# Patient Record
Sex: FEMALE | Race: WHITE | Employment: UNEMPLOYED | URBAN - METROPOLITAN AREA
[De-identification: names, ages, dates, MRNs, and addresses within clinical notes are randomized per-mention and may not be internally consistent; named-entity substitution may affect disease eponyms.]

---

## 2019-05-17 ENCOUNTER — HOSPITAL ENCOUNTER (OUTPATIENT)
Age: 84
Discharge: HOME | End: 2019-05-19
Payer: MEDICARE

## 2019-05-17 LAB
ALBUMIN SERPL-MCNC: 4.1 G/DL (ref 3.5–5.2)
ALP BLD-CCNC: 66 U/L (ref 35–104)
ALT SERPL-CCNC: 13 U/L (ref 0–32)
ANION GAP SERPL CALCULATED.3IONS-SCNC: 15 MMOL/L (ref 7–16)
AST SERPL-CCNC: 25 U/L (ref 0–31)
BILIRUB SERPL-MCNC: 0.3 MG/DL (ref 0–1.2)
BUN BLDV-MCNC: 24 MG/DL (ref 8–23)
CALCIUM SERPL-MCNC: 9.6 MG/DL (ref 8.6–10.2)
CHLORIDE BLD-SCNC: 105 MMOL/L (ref 98–107)
CHOLESTEROL, TOTAL: 251 MG/DL (ref 0–199)
CO2: 20 MMOL/L (ref 22–29)
CREAT SERPL-MCNC: 0.9 MG/DL (ref 0.5–1)
GFR AFRICAN AMERICAN: >60
GFR NON-AFRICAN AMERICAN: 59 ML/MIN/1.73
GLUCOSE BLD-MCNC: 88 MG/DL (ref 74–99)
HBA1C MFR BLD: 5.4 % (ref 4–5.6)
HCT VFR BLD CALC: 38.4 % (ref 34–48)
HDLC SERPL-MCNC: 81 MG/DL
HEMOGLOBIN: 12.4 G/DL (ref 11.5–15.5)
LDL CHOLESTEROL CALCULATED: 150 MG/DL (ref 0–99)
MCH RBC QN AUTO: 30.8 PG (ref 26–35)
MCHC RBC AUTO-ENTMCNC: 32.3 % (ref 32–34.5)
MCV RBC AUTO: 95.3 FL (ref 80–99.9)
PDW BLD-RTO: 13.3 FL (ref 11.5–15)
PLATELET # BLD: 240 E9/L (ref 130–450)
PMV BLD AUTO: 10.1 FL (ref 7–12)
POTASSIUM SERPL-SCNC: 4 MMOL/L (ref 3.5–5)
RBC # BLD: 4.03 E12/L (ref 3.5–5.5)
SODIUM BLD-SCNC: 140 MMOL/L (ref 132–146)
TOTAL PROTEIN: 7.2 G/DL (ref 6.4–8.3)
TRIGL SERPL-MCNC: 99 MG/DL (ref 0–149)
VITAMIN B-12: 961 PG/ML (ref 211–946)
VITAMIN D 25-HYDROXY: 33 NG/ML (ref 30–100)
VLDLC SERPL CALC-MCNC: 20 MG/DL
WBC # BLD: 5.2 E9/L (ref 4.5–11.5)

## 2019-05-17 PROCEDURE — 82306 VITAMIN D 25 HYDROXY: CPT

## 2019-05-17 PROCEDURE — 82607 VITAMIN B-12: CPT

## 2019-05-17 PROCEDURE — 80061 LIPID PANEL: CPT

## 2019-05-17 PROCEDURE — 85027 COMPLETE CBC AUTOMATED: CPT

## 2019-05-17 PROCEDURE — 80053 COMPREHEN METABOLIC PANEL: CPT

## 2019-05-17 PROCEDURE — 84443 ASSAY THYROID STIM HORMONE: CPT

## 2019-05-17 PROCEDURE — 83036 HEMOGLOBIN GLYCOSYLATED A1C: CPT

## 2019-05-20 LAB — TSH SERPL DL<=0.05 MIU/L-ACNC: 2.49 UIU/ML (ref 0.27–4.2)

## 2020-03-11 ENCOUNTER — HOSPITAL ENCOUNTER (OUTPATIENT)
Age: 85
Discharge: HOME | End: 2020-03-13
Payer: MEDICARE

## 2020-03-11 LAB
ALBUMIN SERPL-MCNC: 3.9 G/DL (ref 3.5–5.2)
ALP BLD-CCNC: 77 U/L (ref 35–104)
ALT SERPL-CCNC: 10 U/L (ref 0–32)
ANION GAP SERPL CALCULATED.3IONS-SCNC: 20 MMOL/L (ref 7–16)
AST SERPL-CCNC: 21 U/L (ref 0–31)
BACTERIA: ABNORMAL /HPF
BILIRUB SERPL-MCNC: 0.2 MG/DL (ref 0–1.2)
BILIRUBIN URINE: NEGATIVE
BLOOD, URINE: NORMAL
BUN BLDV-MCNC: 28 MG/DL (ref 8–23)
CALCIUM SERPL-MCNC: 9.9 MG/DL (ref 8.6–10.2)
CHLORIDE BLD-SCNC: 108 MMOL/L (ref 98–107)
CHOLESTEROL, TOTAL: 287 MG/DL (ref 0–199)
CLARITY: CLEAR
CO2: 18 MMOL/L (ref 22–29)
COLOR: YELLOW
CREAT SERPL-MCNC: 1.1 MG/DL (ref 0.5–1)
EPITHELIAL CELLS, UA: ABNORMAL /HPF
GFR AFRICAN AMERICAN: 56
GFR NON-AFRICAN AMERICAN: 47 ML/MIN/1.73
GLUCOSE BLD-MCNC: 96 MG/DL (ref 74–99)
GLUCOSE URINE: NEGATIVE MG/DL
HCT VFR BLD CALC: 40.2 % (ref 34–48)
HDLC SERPL-MCNC: 84 MG/DL
HEMOGLOBIN: 12.7 G/DL (ref 11.5–15.5)
KETONES, URINE: NEGATIVE MG/DL
LDL CHOLESTEROL CALCULATED: 178 MG/DL (ref 0–99)
LEUKOCYTE ESTERASE, URINE: NEGATIVE
MCH RBC QN AUTO: 30.4 PG (ref 26–35)
MCHC RBC AUTO-ENTMCNC: 31.6 % (ref 32–34.5)
MCV RBC AUTO: 96.2 FL (ref 80–99.9)
NITRITE, URINE: NEGATIVE
PDW BLD-RTO: 12.8 FL (ref 11.5–15)
PH UA: 5 (ref 5–9)
PLATELET # BLD: 272 E9/L (ref 130–450)
PMV BLD AUTO: 10.6 FL (ref 7–12)
POTASSIUM SERPL-SCNC: 4.1 MMOL/L (ref 3.5–5)
PROTEIN UA: NORMAL MG/DL
RBC # BLD: 4.18 E12/L (ref 3.5–5.5)
RBC UA: ABNORMAL /HPF (ref 0–2)
SODIUM BLD-SCNC: 146 MMOL/L (ref 132–146)
SPECIFIC GRAVITY UA: >=1.03 (ref 1–1.03)
TOTAL PROTEIN: 7.3 G/DL (ref 6.4–8.3)
TRIGL SERPL-MCNC: 126 MG/DL (ref 0–149)
TSH SERPL DL<=0.05 MIU/L-ACNC: 2.75 UIU/ML (ref 0.27–4.2)
UROBILINOGEN, URINE: 0.2 E.U./DL
VITAMIN D 25-HYDROXY: 32 NG/ML (ref 30–100)
VLDLC SERPL CALC-MCNC: 25 MG/DL
WBC # BLD: 6.3 E9/L (ref 4.5–11.5)
WBC UA: ABNORMAL /HPF (ref 0–5)

## 2020-03-11 PROCEDURE — 81001 URINALYSIS AUTO W/SCOPE: CPT

## 2020-03-11 PROCEDURE — 84443 ASSAY THYROID STIM HORMONE: CPT

## 2020-03-11 PROCEDURE — 85027 COMPLETE CBC AUTOMATED: CPT

## 2020-03-11 PROCEDURE — 80053 COMPREHEN METABOLIC PANEL: CPT

## 2020-03-11 PROCEDURE — 82306 VITAMIN D 25 HYDROXY: CPT

## 2020-03-11 PROCEDURE — 80061 LIPID PANEL: CPT

## 2021-01-04 ENCOUNTER — APPOINTMENT (OUTPATIENT)
Dept: GENERAL RADIOLOGY | Age: 86
DRG: 552 | End: 2021-01-04
Payer: MEDICARE

## 2021-01-04 ENCOUNTER — APPOINTMENT (OUTPATIENT)
Dept: CT IMAGING | Age: 86
DRG: 552 | End: 2021-01-04
Payer: MEDICARE

## 2021-01-04 ENCOUNTER — HOSPITAL ENCOUNTER (INPATIENT)
Age: 86
LOS: 2 days | Discharge: NURSING, REHAB OR LONG-TERM CARE | DRG: 552 | End: 2021-01-06
Attending: EMERGENCY MEDICINE | Admitting: INTERNAL MEDICINE
Payer: MEDICARE

## 2021-01-04 DIAGNOSIS — S12.501A CLOSED NONDISPLACED FRACTURE OF SIXTH CERVICAL VERTEBRA, UNSPECIFIED FRACTURE MORPHOLOGY, INITIAL ENCOUNTER (HCC): Primary | ICD-10-CM

## 2021-01-04 DIAGNOSIS — S42.291A HUMERAL HEAD FRACTURE, RIGHT, CLOSED, INITIAL ENCOUNTER: ICD-10-CM

## 2021-01-04 DIAGNOSIS — S42.024A CLOSED NONDISPLACED FRACTURE OF SHAFT OF RIGHT CLAVICLE, INITIAL ENCOUNTER: ICD-10-CM

## 2021-01-04 DIAGNOSIS — S12.601A CLOSED NONDISPLACED FRACTURE OF SEVENTH CERVICAL VERTEBRA, UNSPECIFIED FRACTURE MORPHOLOGY, INITIAL ENCOUNTER (HCC): ICD-10-CM

## 2021-01-04 DIAGNOSIS — I67.1 SACCULAR ANEURYSM: ICD-10-CM
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
## 2021-01-04 PROBLEM — R53.1 WEAKNESS: Status: ACTIVE | Noted: 2021-01-04

## 2021-01-04 PROBLEM — S12.500A: Status: ACTIVE | Noted: 2021-01-04

## 2021-01-04 LAB
ALBUMIN SERPL-MCNC: 3.9 G/DL (ref 3.5–5.2)
ALP BLD-CCNC: 97 U/L (ref 35–104)
ALT SERPL-CCNC: 15 U/L (ref 0–32)
ANION GAP SERPL CALCULATED.3IONS-SCNC: 14 MMOL/L (ref 7–16)
AST SERPL-CCNC: 27 U/L (ref 0–31)
BACTERIA: ABNORMAL /HPF
BASOPHILS ABSOLUTE: 0.06 E9/L (ref 0–0.2)
BASOPHILS RELATIVE PERCENT: 0.5 % (ref 0–2)
BILIRUB SERPL-MCNC: 0.6 MG/DL (ref 0–1.2)
BILIRUBIN URINE: NEGATIVE
BLOOD, URINE: ABNORMAL
BUN BLDV-MCNC: 20 MG/DL (ref 8–23)
CALCIUM SERPL-MCNC: 9.9 MG/DL (ref 8.6–10.2)
CHLORIDE BLD-SCNC: 103 MMOL/L (ref 98–107)
CLARITY: CLEAR
CO2: 19 MMOL/L (ref 22–29)
COLOR: YELLOW
CREAT SERPL-MCNC: 1.1 MG/DL (ref 0.5–1)
EKG ATRIAL RATE: 96 BPM
EKG P AXIS: 75 DEGREES
EKG P-R INTERVAL: 174 MS
EKG Q-T INTERVAL: 404 MS
EKG QRS DURATION: 118 MS
EKG QTC CALCULATION (BAZETT): 474 MS
EKG R AXIS: -57 DEGREES
EKG T AXIS: 99 DEGREES
EKG VENTRICULAR RATE: 83 BPM
EOSINOPHILS ABSOLUTE: 0.04 E9/L (ref 0.05–0.5)
EOSINOPHILS RELATIVE PERCENT: 0.3 % (ref 0–6)
FOLATE: 14.6 NG/ML (ref 4.8–24.2)
GFR AFRICAN AMERICAN: 56
GFR NON-AFRICAN AMERICAN: 46 ML/MIN/1.73
GLUCOSE BLD-MCNC: 130 MG/DL (ref 74–99)
GLUCOSE URINE: NEGATIVE MG/DL
HCT VFR BLD CALC: 40.1 % (ref 34–48)
HEMOGLOBIN: 13.4 G/DL (ref 11.5–15.5)
HYALINE CASTS: ABNORMAL /LPF (ref 0–2)
IMMATURE GRANULOCYTES #: 0.06 E9/L
IMMATURE GRANULOCYTES %: 0.5 % (ref 0–5)
KETONES, URINE: NEGATIVE MG/DL
LEUKOCYTE ESTERASE, URINE: NEGATIVE
LYMPHOCYTES ABSOLUTE: 1.55 E9/L (ref 1.5–4)
LYMPHOCYTES RELATIVE PERCENT: 11.7 % (ref 20–42)
MCH RBC QN AUTO: 30.9 PG (ref 26–35)
MCHC RBC AUTO-ENTMCNC: 33.4 % (ref 32–34.5)
MCV RBC AUTO: 92.4 FL (ref 80–99.9)
MONOCYTES ABSOLUTE: 0.83 E9/L (ref 0.1–0.95)
MONOCYTES RELATIVE PERCENT: 6.3 % (ref 2–12)
NEUTROPHILS ABSOLUTE: 10.66 E9/L (ref 1.8–7.3)
NEUTROPHILS RELATIVE PERCENT: 80.7 % (ref 43–80)
NITRITE, URINE: NEGATIVE
PDW BLD-RTO: 11.9 FL (ref 11.5–15)
PH UA: 6 (ref 5–9)
PLATELET # BLD: 290 E9/L (ref 130–450)
PMV BLD AUTO: 9.6 FL (ref 7–12)
POTASSIUM REFLEX MAGNESIUM: 3.8 MMOL/L (ref 3.5–5)
PROTEIN UA: NEGATIVE MG/DL
RBC # BLD: 4.34 E12/L (ref 3.5–5.5)
RBC UA: ABNORMAL /HPF (ref 0–2)
SODIUM BLD-SCNC: 136 MMOL/L (ref 132–146)
SPECIFIC GRAVITY UA: 1.01 (ref 1–1.03)
TOTAL PROTEIN: 7 G/DL (ref 6.4–8.3)
TROPONIN: <0.01 NG/ML (ref 0–0.03)
TSH SERPL DL<=0.05 MIU/L-ACNC: <0.01 UIU/ML (ref 0.27–4.2)
UROBILINOGEN, URINE: 0.2 E.U./DL
VITAMIN B-12: 623 PG/ML (ref 211–946)
WBC # BLD: 13.2 E9/L (ref 4.5–11.5)
WBC UA: ABNORMAL /HPF (ref 0–5)

## 2021-01-04 PROCEDURE — 51702 INSERT TEMP BLADDER CATH: CPT

## 2021-01-04 PROCEDURE — 1200000000 HC SEMI PRIVATE

## 2021-01-04 PROCEDURE — 73521 X-RAY EXAM HIPS BI 2 VIEWS: CPT

## 2021-01-04 PROCEDURE — 82746 ASSAY OF FOLIC ACID SERUM: CPT

## 2021-01-04 PROCEDURE — 6360000002 HC RX W HCPCS: Performed by: INTERNAL MEDICINE

## 2021-01-04 PROCEDURE — 6370000000 HC RX 637 (ALT 250 FOR IP): Performed by: STUDENT IN AN ORGANIZED HEALTH CARE EDUCATION/TRAINING PROGRAM

## 2021-01-04 PROCEDURE — 6360000004 HC RX CONTRAST MEDICATION: Performed by: RADIOLOGY

## 2021-01-04 PROCEDURE — 2580000003 HC RX 258: Performed by: INTERNAL MEDICINE

## 2021-01-04 PROCEDURE — 81001 URINALYSIS AUTO W/SCOPE: CPT

## 2021-01-04 PROCEDURE — 96374 THER/PROPH/DIAG INJ IV PUSH: CPT

## 2021-01-04 PROCEDURE — 6360000002 HC RX W HCPCS: Performed by: STUDENT IN AN ORGANIZED HEALTH CARE EDUCATION/TRAINING PROGRAM

## 2021-01-04 PROCEDURE — 85025 COMPLETE CBC W/AUTO DIFF WBC: CPT

## 2021-01-04 PROCEDURE — 70496 CT ANGIOGRAPHY HEAD: CPT

## 2021-01-04 PROCEDURE — 84443 ASSAY THYROID STIM HORMONE: CPT

## 2021-01-04 PROCEDURE — 84484 ASSAY OF TROPONIN QUANT: CPT

## 2021-01-04 PROCEDURE — 2580000003 HC RX 258: Performed by: STUDENT IN AN ORGANIZED HEALTH CARE EDUCATION/TRAINING PROGRAM

## 2021-01-04 PROCEDURE — 73030 X-RAY EXAM OF SHOULDER: CPT

## 2021-01-04 PROCEDURE — 71045 X-RAY EXAM CHEST 1 VIEW: CPT

## 2021-01-04 PROCEDURE — 93005 ELECTROCARDIOGRAM TRACING: CPT | Performed by: STUDENT IN AN ORGANIZED HEALTH CARE EDUCATION/TRAINING PROGRAM

## 2021-01-04 PROCEDURE — 70450 CT HEAD/BRAIN W/O DYE: CPT

## 2021-01-04 PROCEDURE — 72125 CT NECK SPINE W/O DYE: CPT

## 2021-01-04 PROCEDURE — 99284 EMERGENCY DEPT VISIT MOD MDM: CPT

## 2021-01-04 PROCEDURE — 99231 SBSQ HOSP IP/OBS SF/LOW 25: CPT | Performed by: ORTHOPAEDIC SURGERY

## 2021-01-04 PROCEDURE — 36415 COLL VENOUS BLD VENIPUNCTURE: CPT

## 2021-01-04 PROCEDURE — 6370000000 HC RX 637 (ALT 250 FOR IP): Performed by: INTERNAL MEDICINE

## 2021-01-04 PROCEDURE — 82607 VITAMIN B-12: CPT

## 2021-01-04 PROCEDURE — 99223 1ST HOSP IP/OBS HIGH 75: CPT | Performed by: INTERNAL MEDICINE

## 2021-01-04 PROCEDURE — 99222 1ST HOSP IP/OBS MODERATE 55: CPT | Performed by: SURGERY

## 2021-01-04 PROCEDURE — 80053 COMPREHEN METABOLIC PANEL: CPT

## 2021-01-04 PROCEDURE — 93010 ELECTROCARDIOGRAM REPORT: CPT | Performed by: INTERNAL MEDICINE

## 2021-01-04 RX ORDER — ACETAMINOPHEN 325 MG/1
650 TABLET ORAL EVERY 6 HOURS PRN
Status: DISCONTINUED | OUTPATIENT
Start: 2021-01-04 | End: 2021-01-06 | Stop reason: HOSPADM

## 2021-01-04 RX ORDER — ACETAMINOPHEN 325 MG/1
650 TABLET ORAL ONCE
Status: COMPLETED | OUTPATIENT
Start: 2021-01-04 | End: 2021-01-04

## 2021-01-04 RX ORDER — SIMVASTATIN 40 MG
40 TABLET ORAL NIGHTLY
COMMUNITY

## 2021-01-04 RX ORDER — METOPROLOL SUCCINATE 25 MG/1
25 TABLET, EXTENDED RELEASE ORAL DAILY
Status: DISCONTINUED | OUTPATIENT
Start: 2021-01-04 | End: 2021-01-06 | Stop reason: HOSPADM

## 2021-01-04 RX ORDER — 0.9 % SODIUM CHLORIDE 0.9 %
500 INTRAVENOUS SOLUTION INTRAVENOUS ONCE
Status: COMPLETED | OUTPATIENT
Start: 2021-01-04 | End: 2021-01-04

## 2021-01-04 RX ORDER — LORAZEPAM 2 MG/ML
0.5 INJECTION INTRAMUSCULAR ONCE
Status: COMPLETED | OUTPATIENT
Start: 2021-01-04 | End: 2021-01-04

## 2021-01-04 RX ORDER — ACETAMINOPHEN 650 MG/1
650 SUPPOSITORY RECTAL EVERY 6 HOURS PRN
Status: DISCONTINUED | OUTPATIENT
Start: 2021-01-04 | End: 2021-01-06 | Stop reason: HOSPADM

## 2021-01-04 RX ORDER — LOSARTAN POTASSIUM AND HYDROCHLOROTHIAZIDE 25; 100 MG/1; MG/1
1 TABLET ORAL DAILY
Status: ON HOLD | COMMUNITY
End: 2021-01-06 | Stop reason: HOSPADM

## 2021-01-04 RX ORDER — POLYETHYLENE GLYCOL 3350 17 G/17G
17 POWDER, FOR SOLUTION ORAL DAILY PRN
Status: DISCONTINUED | OUTPATIENT
Start: 2021-01-04 | End: 2021-01-06 | Stop reason: HOSPADM

## 2021-01-04 RX ORDER — AMLODIPINE BESYLATE 5 MG/1
5 TABLET ORAL DAILY
Status: DISCONTINUED | OUTPATIENT
Start: 2021-01-04 | End: 2021-01-06 | Stop reason: HOSPADM

## 2021-01-04 RX ORDER — ONDANSETRON 2 MG/ML
4 INJECTION INTRAMUSCULAR; INTRAVENOUS EVERY 6 HOURS PRN
Status: DISCONTINUED | OUTPATIENT
Start: 2021-01-04 | End: 2021-01-06 | Stop reason: HOSPADM

## 2021-01-04 RX ORDER — LEVOTHYROXINE SODIUM 0.1 MG/1
100 TABLET ORAL DAILY
Status: DISCONTINUED | OUTPATIENT
Start: 2021-01-04 | End: 2021-01-05

## 2021-01-04 RX ORDER — ATORVASTATIN CALCIUM 10 MG/1
10 TABLET, FILM COATED ORAL DAILY
Status: DISCONTINUED | OUTPATIENT
Start: 2021-01-04 | End: 2021-01-06 | Stop reason: HOSPADM

## 2021-01-04 RX ORDER — SODIUM CHLORIDE 0.9 % (FLUSH) 0.9 %
10 SYRINGE (ML) INJECTION PRN
Status: DISCONTINUED | OUTPATIENT
Start: 2021-01-04 | End: 2021-01-06 | Stop reason: HOSPADM

## 2021-01-04 RX ORDER — AMLODIPINE BESYLATE 5 MG/1
5 TABLET ORAL DAILY
Status: ON HOLD | COMMUNITY
End: 2021-01-06 | Stop reason: HOSPADM

## 2021-01-04 RX ORDER — LEVOTHYROXINE SODIUM 0.1 MG/1
100 TABLET ORAL DAILY
Status: ON HOLD | COMMUNITY
End: 2021-01-06 | Stop reason: HOSPADM

## 2021-01-04 RX ORDER — SODIUM CHLORIDE 0.9 % (FLUSH) 0.9 %
10 SYRINGE (ML) INJECTION EVERY 12 HOURS SCHEDULED
Status: DISCONTINUED | OUTPATIENT
Start: 2021-01-04 | End: 2021-01-06 | Stop reason: HOSPADM

## 2021-01-04 RX ORDER — PROMETHAZINE HYDROCHLORIDE 25 MG/1
12.5 TABLET ORAL EVERY 6 HOURS PRN
Status: DISCONTINUED | OUTPATIENT
Start: 2021-01-04 | End: 2021-01-06 | Stop reason: HOSPADM

## 2021-01-04 RX ORDER — METOPROLOL SUCCINATE 25 MG/1
25 TABLET, EXTENDED RELEASE ORAL DAILY
COMMUNITY

## 2021-01-04 RX ADMIN — LEVOTHYROXINE SODIUM 100 MCG: 0.1 TABLET ORAL at 16:22

## 2021-01-04 RX ADMIN — IOPAMIDOL 75 ML: 755 INJECTION, SOLUTION INTRAVENOUS at 10:33

## 2021-01-04 RX ADMIN — METOPROLOL SUCCINATE 25 MG: 25 TABLET, EXTENDED RELEASE ORAL at 16:22

## 2021-01-04 RX ADMIN — ENOXAPARIN SODIUM 30 MG: 30 INJECTION SUBCUTANEOUS at 15:28

## 2021-01-04 RX ADMIN — ATORVASTATIN CALCIUM 10 MG: 10 TABLET, FILM COATED ORAL at 16:22

## 2021-01-04 RX ADMIN — SODIUM CHLORIDE 500 ML: 9 INJECTION, SOLUTION INTRAVENOUS at 11:13

## 2021-01-04 RX ADMIN — LORAZEPAM 0.5 MG: 2 INJECTION INTRAMUSCULAR; INTRAVENOUS at 12:14

## 2021-01-04 RX ADMIN — ACETAMINOPHEN 650 MG: 325 TABLET, FILM COATED ORAL at 08:27

## 2021-01-04 RX ADMIN — LORAZEPAM 0.5 MG: 2 INJECTION INTRAMUSCULAR; INTRAVENOUS at 09:45

## 2021-01-04 RX ADMIN — Medication 10 ML: at 19:44

## 2021-01-04 RX ADMIN — AMLODIPINE BESYLATE 5 MG: 5 TABLET ORAL at 16:22

## 2021-01-04 RX ADMIN — ACETAMINOPHEN 650 MG: 325 TABLET, FILM COATED ORAL at 23:08

## 2021-01-04 RX ADMIN — ACETAMINOPHEN 650 MG: 325 TABLET, FILM COATED ORAL at 15:27

## 2021-01-04 ASSESSMENT — PAIN DESCRIPTION - PAIN TYPE: TYPE: ACUTE PAIN

## 2021-01-04 ASSESSMENT — PAIN SCALES - GENERAL
PAINLEVEL_OUTOF10: 7
PAINLEVEL_OUTOF10: 9

## 2021-01-04 ASSESSMENT — PAIN DESCRIPTION - ORIENTATION: ORIENTATION: RIGHT;LEFT

## 2021-01-04 ASSESSMENT — PAIN DESCRIPTION - LOCATION: LOCATION: SHOULDER

## 2021-01-04 NOTE — CONSULTS
Department of Orthopedic Surgery  Resident Consult Note          Reason for Consult:  Right shoulder pain     HISTORY OF PRESENT ILLNESS:       Patient is a 80 y.o. female who presents with the chief complaint of right shoulder pain. The patient had a fall from standing height. She was brought to the ED by EMS for evaluation. The patient thinks she did bump her head she is unsure whether or not she lost consciousness. She states she thinks she may have fainted. The patient states she lives at home alone. She has had a prior proximal humerus fracture on the right in 2013. Denies numbness/tingling/paresthesias. Denies any other orthopedic complaints at this time. Past Medical History:        Diagnosis Date    Hyperlipidemia     Hypertension     Thyroid disease      Past Surgical History:        Procedure Laterality Date    HYSTERECTOMY       Current Medications:   Current Facility-Administered Medications: sodium chloride flush 0.9 % injection 10 mL, 10 mL, Intravenous, 2 times per day  sodium chloride flush 0.9 % injection 10 mL, 10 mL, Intravenous, PRN  promethazine (PHENERGAN) tablet 12.5 mg, 12.5 mg, Oral, Q6H PRN **OR** ondansetron (ZOFRAN) injection 4 mg, 4 mg, Intravenous, Q6H PRN  polyethylene glycol (GLYCOLAX) packet 17 g, 17 g, Oral, Daily PRN  acetaminophen (TYLENOL) tablet 650 mg, 650 mg, Oral, Q6H PRN **OR** acetaminophen (TYLENOL) suppository 650 mg, 650 mg, Rectal, Q6H PRN  enoxaparin (LOVENOX) injection 30 mg, 30 mg, Subcutaneous, Daily  amLODIPine (NORVASC) tablet 5 mg, 5 mg, Oral, Daily  levothyroxine (SYNTHROID) tablet 100 mcg, 100 mcg, Oral, Daily  atorvastatin (LIPITOR) tablet 10 mg, 10 mg, Oral, Daily  metoprolol succinate (TOPROL XL) extended release tablet 25 mg, 25 mg, Oral, Daily  Allergies:  Pcn [penicillins]    Social History:   TOBACCO:   reports that she has never smoked. She has never used smokeless tobacco.  ETOH:   reports no history of alcohol use.   DRUGS:   has no history on file for drug. ACTIVITIES OF DAILY LIVING:    OCCUPATION:    Family History:   History reviewed. No pertinent family history. REVIEW OF SYSTEMS:  CONSTITUTIONAL:  negative for  fevers, chills  EYES:  negative for blurred vision, visual disturbance  HEENT:  negative for  hearing loss, voice change  RESPIRATORY:  negative for  dyspnea, wheezing  CARDIOVASCULAR:  negative for  chest pain, palpitations  GASTROINTESTINAL:  negative for nausea, vomiting  GENITOURINARY:  negative for frequency, urinary incontinence  HEMATOLOGIC/LYMPHATIC:  negative for bleeding and petechiae  MUSCULOSKELETAL:  positive for  pain  NEUROLOGICAL:  negative for headaches, dizziness  BEHAVIOR/PSYCH:  negative for increased agitation and anxiety    PHYSICAL EXAM:    VITALS:  BP (!) 149/84   Pulse 100   Temp 96.9 °F (36.1 °C) (Axillary)   Resp 16   Ht 5' 5\" (1.651 m)   Wt 110 lb (49.9 kg)   SpO2 94%   BMI 18.30 kg/m²   CONSTITUTIONAL:  awake, alert, cooperative, no apparent distress, and appears stated age  MUSCULOSKELETAL:  Right upper Extremity:  ·  Skin intact circumferentially no tenting over the right clavicle   · +TTP over the right clavicle. There is crepitus noted when pressing on the right clavicle. Compartments soft and compressible  · +AIN/PIN/Ulnar nerve function intact grossly  · +Radial pulse, Brisk Cap refill, hand warm and perfused  · Sensation intact to touch in radial/ulnar/median nerve distributions to hand    Secondary Exam:   · leftUE: No obvious signs of trauma. -TTP to fingers, hand, wrist, forearm, elbow, humerus, shoulder or clavicle. compartments soft and compressible. · bilateralLE: No obvious signs of trauma. -TTP to foot, ankle, leg, knee, thigh, hip. compartments soft and compressible.      DATA:    CBC:   Lab Results   Component Value Date    WBC 13.2 01/04/2021    RBC 4.34 01/04/2021    HGB 13.4 01/04/2021    HCT 40.1 01/04/2021    MCV 92.4 01/04/2021    MCH 30.9 01/04/2021    MCHC 33.4 01/04/2021    RDW 11.9 01/04/2021     01/04/2021    MPV 9.6 01/04/2021     PT/INR:  No results found for: PROTIME, INR  CRP/ESR: No results found for: CRP, SEDRATE  Lactic Acid : No results found for: LACTA    Radiology Review:  01/04/21 - XR of the right shoulder demonstrates a mid shaft clavicle fracture with minimal displacement. There is also a healed proximal humerus fracture consistent with her films from 2013. IMPRESSION:   · Right Clavicle fracture  · Previous proximal humerus fracture from 2013    PLAN:  NWB - RUE  The patient should remain in her sling. Pain Control per primary  Recommend PT/OT   Continue ice and elevation to decrease swelling  · No acute surgical intervention needed at this time. · The patient can follow up with Dr. Anusha Head as an outpatient upon discharge. · Orthopedics will follow peripherally. ·   · I was present with the resident during the history and exam. I discussed the case with the resident and agree with the findings and plan as documented in the resident's note.

## 2021-01-04 NOTE — ED PROVIDER NOTES
Mike [address]      Pt Name: Leena Verdin  MRN: [id number]  [place] 3/31/1929  Date of evaluation: 1/4/2021      CHIEF COMPLAINT       Chief Complaint   Patient presents with   Render Clunes     from home, fell, on ground 30 minutes, alert to person, place, and circumstance, confused to time, lives at home alone, per EMS it would not have been safe to leave patient by herself without care, neighbors report intermittent increasing episodes of confusion        HPI  Leena Verdin is a 80 y.o. female with a past medical history of hyperlipidemia, hypertension presents from home status post fall. As per EMS patient was on the ground for 30 minutes. She is alert to person, place, and circumstance but confused to time. She lives home alone. As per EMS they felt it was not safe to leave the patient very soft without care. As per her neighbor she has had intermittent increasing episodes of confusion. History unable to be obtained from patient as she is confused. Review of systems also unable to be attained because of dementia        Except as noted above the remainder of the review of systems was reviewed and negative. Review of Systems   Unable to perform ROS: Dementia        Physical Exam  Vitals signs and nursing note reviewed. Constitutional:       General: She is not in acute distress. Appearance: Normal appearance. She is well-developed. She is not ill-appearing or diaphoretic. HENT:      Head: Normocephalic and atraumatic. Ears:      Comments: No hemotympanum bilaterally. Nose: Nose normal.      Comments: No septal hematoma bilaterally. Mouth/Throat:      Mouth: Mucous membranes are moist.      Pharynx: Oropharynx is clear. Eyes:      Pupils: Pupils are equal, round, and reactive to light. Neck:      Musculoskeletal: Muscular tenderness ( Tenderness to palpation of the midline of the cervical spine. No bony crepitance appreciated.   Patient kept in the cervical collar.) present. Cardiovascular:      Rate and Rhythm: Normal rate and regular rhythm. Pulses: Normal pulses. Heart sounds: Normal heart sounds. Pulmonary:      Effort: Pulmonary effort is normal. No respiratory distress. Breath sounds: Normal breath sounds. No wheezing or rales. Abdominal:      General: Bowel sounds are normal. There is no distension. Palpations: Abdomen is soft. There is no mass. Tenderness: There is no abdominal tenderness. There is no right CVA tenderness, left CVA tenderness, guarding or rebound. Hernia: No hernia is present. Musculoskeletal:         General: No swelling or tenderness. Right lower leg: No edema. Left lower leg: No edema. Comments: Mild C-spine tenderness. Skin:     General: Skin is warm and dry. Capillary Refill: Capillary refill takes less than 2 seconds. Neurological:      General: No focal deficit present. Mental Status: She is alert and oriented to person, place, and time. Cranial Nerves: No cranial nerve deficit. Motor: No weakness. Coordination: Coordination normal.   Psychiatric:         Mood and Affect: Mood normal.          Procedures     MDM  Number of Diagnoses or Management Options  Diagnosis management comments: 68-year-old female with a past medical history of dementia who lives by herself at home presents with status post unwitnessed fall at home. Vitals within normal limits. On physical exam patient is in no acute distress, speaking full sentences, alert and oriented x2. She is unable to state what happened to her yesterday. She is unaware if she had a syncopal event and fall or a mechanical medical.  HEENT is atraumatic normocephalic. Lungs clear to auscultation bilaterally no wheeze rales rhonchi. Normal S1-S2. Abdomen soft nontender, no rebound or guarding negative CVA tenderness bilaterally. No bilateral leg edema. Diagnostic labs within normal limits. Urinalysis negative for urinary tract infection. CT head negative for acute process. CT C-spine shows transverse fracture of C6-C7. It is a stable fracture. Patient has a right midshaft clavicular fracture. Patient has a right humeral head fracture. Patient unable to walk in department. She seems very agitated and anxious. Wants to speak and see her power of . I was unable to speak to patient about returning but was able to speak to the conservator of her state. He is agreeable with plan for patient to be placed at a assisted living facility as her living by herself is a danger to herself. Patient formed all the results of evaluation. She is agreeable plan. Dr. Naina Rosario admitted patient. Trauma service has been consulted. ED Course as of Jan 04 1755   Mon Jan 04, 2021   7889 Spoke with Dr. Lorna Sidhu radiology informing that the patient has stable fractures of C6-C7 transverse processesRight clavicular fracture on CT   CT Cervical Spine WO Contrast [JV]   1 Spoke with patient's conservator of her estate. As per her him, patient does not have any family and has a power of . She lives by herself but has home health aides stay during the day to evaluate her. States that the patient has dementia and is unable to take care of herself. [JV]   3490 Spoke to trauma service. They are aware of patient. Patient admitted to the hospital.    [JV]      ED Course User Index  [JV] Maggie Herman MD       --------------------------------------------- PAST HISTORY ---------------------------------------------  Past Medical History:  has a past medical history of Hyperlipidemia, Hypertension, and Thyroid disease. Past Surgical History:  has a past surgical history that includes Hysterectomy. Social History:  reports that she has never smoked. She has never used smokeless tobacco. She reports that she does not drink alcohol. Family History: family history is not on file.      The patients home medications have been reviewed.     Allergies: Pcn [penicillins]    -------------------------------------------------- RESULTS -------------------------------------------------    LABS:  Results for orders placed or performed during the hospital encounter of 01/04/21   CBC Auto Differential   Result Value Ref Range    WBC 13.2 (H) 4.5 - 11.5 E9/L    RBC 4.34 3.50 - 5.50 E12/L    Hemoglobin 13.4 11.5 - 15.5 g/dL    Hematocrit 40.1 34.0 - 48.0 %    MCV 92.4 80.0 - 99.9 fL    MCH 30.9 26.0 - 35.0 pg    MCHC 33.4 32.0 - 34.5 %    RDW 11.9 11.5 - 15.0 fL    Platelets 954 321 - 613 E9/L    MPV 9.6 7.0 - 12.0 fL    Neutrophils % 80.7 (H) 43.0 - 80.0 %    Immature Granulocytes % 0.5 0.0 - 5.0 %    Lymphocytes % 11.7 (L) 20.0 - 42.0 %    Monocytes % 6.3 2.0 - 12.0 %    Eosinophils % 0.3 0.0 - 6.0 %    Basophils % 0.5 0.0 - 2.0 %    Neutrophils Absolute 10.66 (H) 1.80 - 7.30 E9/L    Immature Granulocytes # 0.06 E9/L    Lymphocytes Absolute 1.55 1.50 - 4.00 E9/L    Monocytes Absolute 0.83 0.10 - 0.95 E9/L    Eosinophils Absolute 0.04 (L) 0.05 - 0.50 E9/L    Basophils Absolute 0.06 0.00 - 0.20 E9/L   Comprehensive Metabolic Panel w/ Reflex to MG   Result Value Ref Range    Sodium 136 132 - 146 mmol/L    Potassium reflex Magnesium 3.8 3.5 - 5.0 mmol/L    Chloride 103 98 - 107 mmol/L    CO2 19 (L) 22 - 29 mmol/L    Anion Gap 14 7 - 16 mmol/L    Glucose 130 (H) 74 - 99 mg/dL    BUN 20 8 - 23 mg/dL    CREATININE 1.1 (H) 0.5 - 1.0 mg/dL    GFR Non-African American 46 >=60 mL/min/1.73    GFR African American 56     Calcium 9.9 8.6 - 10.2 mg/dL    Total Protein 7.0 6.4 - 8.3 g/dL    Alb 3.9 3.5 - 5.2 g/dL    Total Bilirubin 0.6 0.0 - 1.2 mg/dL    Alkaline Phosphatase 97 35 - 104 U/L    ALT 15 0 - 32 U/L    AST 27 0 - 31 U/L   Urinalysis, reflex to microscopic   Result Value Ref Range    Color, UA Yellow Straw/Yellow    Clarity, UA Clear Clear    Glucose, Ur Negative Negative mg/dL    Bilirubin Urine Negative Negative Ketones, Urine Negative Negative mg/dL    Specific Gravity, UA 1.015 1.005 - 1.030    Blood, Urine SMALL (A) Negative    pH, UA 6.0 5.0 - 9.0    Protein, UA Negative Negative mg/dL    Urobilinogen, Urine 0.2 <2.0 E.U./dL    Nitrite, Urine Negative Negative    Leukocyte Esterase, Urine Negative Negative   Troponin   Result Value Ref Range    Troponin <0.01 0.00 - 0.03 ng/mL   Microscopic Urinalysis   Result Value Ref Range    Hyaline Casts, UA 0-2 0 - 2 /LPF    WBC, UA 0-1 0 - 5 /HPF    RBC, UA 0-1 0 - 2 /HPF    Bacteria, UA RARE (A) None Seen /HPF   TSH without Reflex   Result Value Ref Range    TSH <0.010 (L) 0.270 - 4.200 uIU/mL   EKG 12 Lead   Result Value Ref Range    Ventricular Rate 83 BPM    Atrial Rate 96 BPM    P-R Interval 174 ms    QRS Duration 118 ms    Q-T Interval 404 ms    QTc Calculation (Bazett) 474 ms    P Axis 75 degrees    R Axis -57 degrees    T Axis 99 degrees       RADIOLOGY:  XR SHOULDER LEFT (MIN 2 VIEWS)   Final Result   Essentially nondisplaced fracture of the midshaft of the right clavicle. There is suspicion of a right humeral head fracture with slight subluxation. Recommend obtaining right axillary view if possible. XR HIP BILATERAL W AP PELVIS (2 VIEWS)   Final Result   No definite acute fracture or dislocation. 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 Osteopenia. XR SHOULDER RIGHT (MIN 2 VIEWS)   Final Result   Essentially nondisplaced fracture of the midshaft of the right clavicle. There is suspicion of a right humeral head fracture with slight subluxation. Recommend obtaining right axillary view if possible. XR CHEST 1 VIEW   Final Result   Essentially nondisplaced fracture of the midshaft of the right clavicle. There is suspicion of a right humeral head fracture with slight subluxation. Recommend obtaining right axillary view if possible.       CTA HEAD W CONTRAST   Final Result   1.2 x 1.5 x 1.3 cm large saccular aneurysm at the superior aspect of the supraclinoid segment of right internal carotid artery. Otherwise, no acute abnormality or flow-limiting stenosis in the remainder of   the major arteries of the head. CT Head WO Contrast   Final Result   Focal 1.5 cm round lesion in in the right middle cerebral artery, likely   represents aneurysm. Further evaluation may be obtained with CT angiogram of   the head if clinically warranted. No calvarial fractures. No intracranial hemorrhage. CT Cervical Spine WO Contrast   Final Result   Addendum 1 of 1   ADDENDUM:   Abnormal CT findings were conveyed by Dr. Cynthia Sal to Dr. Jeff Correa via   telephone at 7246 hours. Final          EKG:    EKG read by me. Heart rate 83. Normal sinus rhythm. Incomplete left bundle branch block. Left axis deviation. No QTC prolongation. No ST elevations or depressions. No previous EKG to compare to.      ------------------------- NURSING NOTES AND VITALS REVIEWED ---------------------------  Date / Time Roomed:  1/4/2021  7:20 AM  ED Bed Assignment:  [id number]/[id number]    The nursing notes within the ED encounter and vital signs as below have been reviewed. Patient Vitals for the past 24 hrs:   BP Temp Temp src Pulse Resp SpO2 Height Weight   01/04/21 1725 (!) 144/70 98.7 °F (37.1 °C) Oral 88 18 95 % -- --   01/04/21 1348 (!) 149/84 96.9 °F (36.1 °C) Axillary 100 16 94 % -- --   01/04/21 1333 128/74 -- -- 76 16 100 % -- --   01/04/21 0731 (!) 167/70 97.9 °F (36.6 °C) Oral 86 18 100 % 5' 5\" (1.651 m) 110 lb (49.9 kg)       Oxygen Saturation Interpretation: Normal    ------------------------------------------ PROGRESS NOTES ------------------------------------------  Re-evaluation(s):  Time: 1300  Patients symptoms show no change  Repeat physical examination is not changed    Time: 1350 patient symptoms show no change.   Repeat physical exam has not changed      Counseling:  I have spoken with the patient and discussed todays results, in addition to providing specific details for the plan of care and counseling regarding the diagnosis and prognosis. Their questions are answered at this time and they are agreeable with the plan of admission.    --------------------------------- ADDITIONAL PROVIDER NOTES ---------------------------------  Consultations:  Spoke with Dr. Mavis Lamb. Discussed case. They will admit the patient. This patient's ED course included: a personal history and physicial examination, re-evaluation prior to disposition, multiple bedside re-evaluations, IV medications, cardiac monitoring and continuous pulse oximetry    This patient has remained hemodynamically stable during their ED course. Diagnosis:  1. Closed nondisplaced fracture of sixth cervical vertebra, unspecified fracture morphology, initial encounter ([facility] [address]    2. Closed nondisplaced fracture of seventh cervical vertebra, unspecified fracture morphology, initial encounter ([facility] [address]    3. Closed nondisplaced fracture of shaft of right clavicle, initial encounter    4. Humeral head fracture, right, closed, initial encounter    5. Saccular aneurysm        Disposition:  Patient's disposition: Admit to med/surg floor  Patient's condition is fair. ATTENDING PROVIDER ATTESTATION:     Alcides Martino presented to the emergency department for evaluation of Fall (from home, fell, on ground 30 minutes, alert to person, place, and circumstance, confused to time, lives at home alone, per EMS it would not have been safe to leave patient by herself without care, neighbors report intermittent increasing episodes of confusion)    I have reviewed and discussed the case, including pertinent history (medical, surgical, family and social) and exam findings with the Resident and the Nurse assigned to Alcides Martino. I have personally performed and/or participated in the history, exam, medical decision making, and procedures and agree with all pertinent clinical information.   Patient resting in bed in no acute distress. Is pleasantly confused. Mild tenderness of the cervical spine but no bony crepitance. Patient kept in cervical collar. No focal neurological deficits appreciated. I have reviewed my findings and recommendations with Bahena Moamara and members of family present at the time of disposition. MDM: Supportive care, will obtain appropriate labs and imaging to assess patient's Fall (from home, fell, on ground 30 minutes, alert to person, place, and circumstance, confused to time, lives at home alone, per EMS it would not have been safe to leave patient by herself without care, neighbors report intermittent increasing episodes of confusion)       My findings/plan: The primary encounter diagnosis was Closed nondisplaced fracture of sixth cervical vertebra, unspecified fracture morphology, initial encounter ([facility] [place] [address] Diagnoses of Closed nondisplaced fracture of seventh cervical vertebra, unspecified fracture morphology, initial encounter [facility]), Closed nondisplaced fracture of shaft of right clavicle, initial encounter, Humeral head fracture, right, closed, initial encounter, and Saccular aneurysm were also pertinent to this visit.   Current Discharge Medication List        Eric DO Brisa Castle MD  Resident  01/04/21 DO Óscar  01/05/21 [id number]

## 2021-01-04 NOTE — CONSULTS
TRAUMA HISTORY & PHYSICAL  Resident   1/4/2021  2:06 PM    Chief Complaint   Patient presents with   Josh Ascencio     from home, fell, on ground 30 minutes, alert to person, place, and circumstance, confused to time, lives at home alone, per EMS it would not have been safe to leave patient by herself without care, neighbors report intermittent increasing episodes of confusion         HPI: Grace Gomez is a 80 y.o. female who presented from home after standing height fall. She states she does not know she lost consciousness for a minute she was on the ground for 30 minutes. She is ANO x3. Does state she fell while in between 2 rooms. She endorses some right shoulder pain but denies other complaints.      PRIMARY SURVEY    AIRWAY:   Airway normal  EMS ETT Absent   Noisy respirations Absent   Retractions: Absent   Vomiting/bleeding: Absent     BREATHING:    Midaxillary breath sound left:  Present  Midaxillary breath sound right: Present  Cough sound intensity:  Fair  Respiratory rate: 17    CIRCULATION:   Femerol pulse rate: within normal limits  Femerol pulse intensity: present  Palpebral conjunctiva: Pink     BP      P     SpO2       T      F    Patient Vitals for the past 8 hrs:   BP Temp Temp src Pulse Resp SpO2 Height Weight   01/04/21 1348 (!) 149/84 96.9 °F (36.1 °C) Axillary 100 16 94 % -- --   01/04/21 1333 128/74 -- -- 76 16 100 % -- --   01/04/21 0731 (!) 167/70 97.9 °F (36.6 °C) Oral 86 18 100 % 5' 5\" (1.651 m) 110 lb (49.9 kg)       FAST EXAM: Not performed    Central Nervous System    GCS Initial 15 minutes   Eye  Motor  Verbal 4 - Opens eyes on own  6 - Follows simple motor commands  5 - Alert and oriented 4 - Opens eyes on own  6 - Follows simple motor commands  5 - Alert and oriented     Neuromuscular blockade: No  Pupil size:  Left 3 mm    Right 3 mm  Pupil reaction: Yes  Wiggles fingers: Left Yes Right Yes  Wiggles toes: Left Yes    Right Yes    Hand grasp:   Left normal       Right normal  Plantar flexion: Left normal     Right normal  Loss of consciousness: Unknown amnestic to event    History Obtained From:  patient  Private Medical Doctor: Judi Yusuf MD    Pre-exisiting Medical History: She denies taking any blood thinners    Conditions:  has a past medical history of Hyperlipidemia, Hypertension, and Thyroid disease. Medications:   Prior to Admission medications    Medication Sig Start Date End Date Taking? Authorizing Provider   amLODIPine (NORVASC) 5 MG tablet Take 5 mg by mouth daily   Yes Historical Provider, MD   metoprolol succinate (TOPROL XL) 25 MG extended release tablet Take 25 mg by mouth daily   Yes Historical Provider, MD   losartan-hydroCHLOROthiazide (HYZAAR) 100-25 MG per tablet Take 1 tablet by mouth daily   Yes Historical Provider, MD   simvastatin (ZOCOR) 40 MG tablet Take 40 mg by mouth nightly   Yes Historical Provider, MD   levothyroxine (SYNTHROID) 100 MCG tablet Take 100 mcg by mouth Daily   Yes Historical Provider, MD       Allergies: Allergies   Allergen Reactions    Pcn [Penicillins]        Social History:   Social History     Tobacco Use    Smoking status: Never Smoker    Smokeless tobacco: Never Used   Substance Use Topics    Alcohol use: No       Past Surgical History:   has a past surgical history that includes Hysterectomy.     NSAID use in last 72 hours: Unknown  Taken PCN in past:  unknown  Last food/drink: Unknown  Last tetanus: Unknown    Complaints:     Head: Denies  Neck: Denies pain  Chest: mild  Back: Denies pain  Abdomen: Denies pain  Extremities: mild  Comments:       SECONDARY SURVEY  Head/scalp: Ecchymosis to forehead    Face: Ecchymosis to nose and right orbit, abrasion to chin    Eyes/ears/nose: EOMI, PEERL,     Pharynx/mouth:  No soft tissue injury, no malocclusion, missing some teeth    Neck: No soft tissue injuries  Cervical spine tenderness: none  ROM: Full range of movement no c-collar in place upon my arrival    Chest wall:  CTAB, mild right clavicular tenderness    Heart: Regular rate    Abdomen: Soft, non-distended, no soft tissue injuries   Tenderness:  none    Pelvis: Stable, no soft tissue injuries, no pain with hip flexion   Tenderness: none    Thoracolumbar spine: No soft tissue injuries, no step-offs  Tenderness:  none    Extremities: Right humeral head fracture, right clavicle fracture  Sensory normal  Motor mild right upper extremity weakness    Distal Pulses  Left arm Normal  Right arm Normal  Left leg Normal  Right leg Normal    Capillary refill   Left arm normal  Right arm normal  Left leg normal   Right leg normal    Radiology:     Xr Shoulder Right (min 2 Views)    Result Date: 1/4/2021  EXAMINATION: ONE XRAY VIEW OF THE CHEST; THREE XRAY VIEWS OF THE RIGHT SHOULDER; THREE XRAY VIEWS OF THE LEFT SHOULDER 1/4/2021 10:42 am COMPARISON: None. HISTORY: ORDERING SYSTEM PROVIDED HISTORY: fever TECHNOLOGIST PROVIDED HISTORY: Reason for exam:->fever FINDINGS: Left shoulder is normal.  There is a fracture of the midshaft of the right clavicle which is essentially nondisplaced. There is a suggestion of a nondisplaced fracture of the right humeral head. The humeral head is somewhat subluxed. Heart size is normal.  There is tortuosity of the aorta. There are no infiltrates or effusions. There is no pneumothorax. There are no obvious rib fractures. Essentially nondisplaced fracture of the midshaft of the right clavicle. There is suspicion of a right humeral head fracture with slight subluxation. Recommend obtaining right axillary view if possible. Xr Hip Bilateral W Ap Pelvis (2 Views)    Result Date: 1/4/2021  EXAMINATION: ONE XRAY VIEW OF THE PELVIS AND TWO XRAY VIEWS OF EACH OF THE BILATERAL HIPS 1/4/2021 9:43 am COMPARISON: None. HISTORY: ORDERING SYSTEM PROVIDED HISTORY: fall TECHNOLOGIST PROVIDED HISTORY: Reason for exam:->fall FINDINGS: Motion artifact is somewhat limiting. No definite acute fracture or dislocation.   Normal soft tissues. The bones are demineralized. 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 No definite acute fracture or dislocation. 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 Osteopenia. Ct Head Wo Contrast    Result Date: 1/4/2021  EXAMINATION: CT OF THE HEAD WITHOUT CONTRAST  1/4/2021 8:41 am TECHNIQUE: CT of the head was performed without the administration of intravenous contrast. Dose modulation, iterative reconstruction, and/or weight based adjustment of the mA/kV was utilized to reduce the radiation dose to as low as reasonably achievable. COMPARISON: None. HISTORY: ORDERING SYSTEM PROVIDED HISTORY: fall TECHNOLOGIST PROVIDED HISTORY: Reason for exam:->fall Has a \"code stroke\" or \"stroke alert\" been called? ->No FINDINGS: BRAIN/VENTRICLES: There is no acute intracranial hemorrhage, mass effect or midline shift. No abnormal extra-axial fluid collection. The gray-white differentiation is maintained without evidence of an acute infarct. There is a focal 1.5 cm round, hyperattenuating lesion overlying the right middle cerebral artery (image 25 series 2). There is no evidence of hydrocephalus. ORBITS: The visualized portion of the orbits demonstrate no acute abnormality. SINUSES: Opacification of the right maxillary sinus. The remaining visualized paranasal sinuses and mastoid air cells demonstrate no acute abnormality. SOFT TISSUES/SKULL:  Focal soft tissue prominence overlying the right frontal calvarium with subcutaneous hematoma measuring approximately 4 mm in thickness. Focal 1.5 cm round lesion in in the right middle cerebral artery, likely represents aneurysm. Further evaluation may be obtained with CT angiogram of the head if clinically warranted. No calvarial fractures. No intracranial hemorrhage. Ct Cervical Spine Wo Contrast    Addendum Date: 1/4/2021    ADDENDUM: Abnormal CT findings were conveyed by Dr. Brad Manning to Dr. Alesia Euceda via telephone at [phone] hours.     Result Date: 1/4/2021  EXAMINATION: CT OF THE VIEW OF THE CHEST; THREE XRAY VIEWS OF THE RIGHT SHOULDER; THREE XRAY VIEWS OF THE LEFT SHOULDER 1/4/2021 10:42 am COMPARISON: None. HISTORY: ORDERING SYSTEM PROVIDED HISTORY: fever TECHNOLOGIST PROVIDED HISTORY: Reason for exam:->fever FINDINGS: Left shoulder is normal.  There is a fracture of the midshaft of the right clavicle which is essentially nondisplaced. There is a suggestion of a nondisplaced fracture of the right humeral head. The humeral head is somewhat subluxed. Heart size is normal.  There is tortuosity of the aorta. There are no infiltrates or effusions. There is no pneumothorax. There are no obvious rib fractures. Essentially nondisplaced fracture of the midshaft of the right clavicle. There is suspicion of a right humeral head fracture with slight subluxation. Recommend obtaining right axillary view if possible. Xr Chest 1 View    Result Date: 1/4/2021  EXAMINATION: ONE XRAY VIEW OF THE CHEST; THREE XRAY VIEWS OF THE RIGHT SHOULDER; THREE XRAY VIEWS OF THE LEFT SHOULDER 1/4/2021 10:42 am COMPARISON: None. HISTORY: ORDERING SYSTEM PROVIDED HISTORY: fever TECHNOLOGIST PROVIDED HISTORY: Reason for exam:->fever FINDINGS: Left shoulder is normal.  There is a fracture of the midshaft of the right clavicle which is essentially nondisplaced. There is a suggestion of a nondisplaced fracture of the right humeral head. The humeral head is somewhat subluxed. Heart size is normal.  There is tortuosity of the aorta. There are no infiltrates or effusions. There is no pneumothorax. There are no obvious rib fractures. Essentially nondisplaced fracture of the midshaft of the right clavicle. There is suspicion of a right humeral head fracture with slight subluxation. Recommend obtaining right axillary view if possible.     Cta Head W Contrast    Result Date: 1/4/2021  EXAMINATION: CTA OF THE HEAD WITH CONTRAST 1/4/2021 9:53 am: TECHNIQUE: CTA of the head/brain was performed with the administration of intravenous contrast. Multiplanar reformatted images are provided for review. MIP images are provided for review. Dose modulation, iterative reconstruction, and/or weight based adjustment of the mA/kV was utilized to reduce the radiation dose to as low as reasonably achievable. COMPARISON: Noncontrast CT head from earlier today HISTORY: ORDERING SYSTEM PROVIDED HISTORY: right mca aneurysm on ct head TECHNOLOGIST PROVIDED HISTORY: Reason for exam:->right mca aneurysm on ct head Has a \"code stroke\" or \"stroke alert\" been called? ->No FINDINGS: ANTERIOR CIRCULATION: The A1 segment of the right TREASURE is hypoplastic. No significant stenosis of the intracranial internal carotid, anterior cerebral, or middle cerebral arteries. There is a 1.2 x 1.5 x 1.3 cm (transverse by AP by craniocaudal) saccular aneurysm at the superior aspect of the supraclinoid segment of right internal carotid artery. POSTERIOR CIRCULATION: There is fetal origin of the right PCA, normal variant. No significant stenosis of the vertebral, basilar, or posterior cerebral arteries. No aneurysm. OTHER: No dural venous sinus thrombosis on this non-dedicated study. BRAIN: No mass effect or midline shift. No extra-axial fluid collection. The gray-white differentiation is maintained. There is opacification of the right maxillary sinus, likely related to sinusitis. 1.2 x 1.5 x 1.3 cm large saccular aneurysm at the superior aspect of the supraclinoid segment of right internal carotid artery. Otherwise, no acute abnormality or flow-limiting stenosis in the remainder of the major arteries of the head.       Consultations: Neurosurgery, orthopedic surgery    Admission/Diagnosis:   80 y.o. 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 Neva Orourke he reviewed the CTA of the head and stated that the aneurysm is not in a place that can be coiled or have surgery on and due to her age he recommends nonoperative management. CTA of the neck due to patient's first rib fracture along with CT of facial bones. Since patient had already received contrast today the scans can be ordered tomorrow pending creatinine. Encourage incentive spirometry every 2 hours  Pain control with Tylenol    Plan discussed with Dr. Nita Rodriguez. Aga Kruse on 1/4/2021 at 2:06 PM      General Surgery Consult Note  Javid Mock MD, MS    Patient's Name/Date of Birth: Edgardo Matias / 3/31/1929    Date: January 4, 2021     Surgeon: Nita Rodriguez MD    Requesting Physician:     Chief Complaint: Fall from unknown height, acute on chronic confusion, first rib fracture, clavicle and humeral head fracture    Patient Active Problem List   Diagnosis    Closed fracture of sixth cervical vertebra without spinal cord injury ([facility] [address]       Subjective: As above. I saw and examined the patient and discussed the above HPI with the resident and agree with documented positive and negative findings. Objective:  BP (!) 149/84   Pulse 100   Temp 96.9 °F (36.1 °C) (Axillary)   Resp 16   Ht 5' 5\" (1.651 m)   Wt 110 lb (49.9 kg)   SpO2 94%   BMI 18.30 kg/m²   Labs:  Reviewed by me and relevant abnormalities noted       A complete 10 system review was performed and are otherwise negative unless mentioned in the above HPI. Specific negatives are listed below but may not include all those reviewed.     General ROS: negative obtundation, AMS  ENT ROS: negative rhinorrhea, epistaxis  Allergy and Immunology ROS: negative itchy/watery eyes or nasal congestion  Hematological and Lymphatic ROS: negative spontaneous bleeding or bruising  Endocrine ROS: negative  lethargy, mood swings, palpitations or polydipsia/polyuria  Respiratory ROS: negative sputum changes, stridor, tachypnea or wheezing  Cardiovascular ROS: negative for - loss of consciousness, murmur or orthopnea  Gastrointestinal ROS: negative for - hematochezia or hematemesis  Genito-Urinary ROS: negative for -  genital discharge or hematuria  Musculoskeletal ROS: negative for - focal weakness, gangrene  Psych/Neuro ROS: negative for - visual or auditory hallucinations, suicidal ideation    Physical exam:   BP (!) 149/84   Pulse 100   Temp 96.9 °F (36.1 °C) (Axillary)   Resp 16   Ht 5' 5\" (1.651 m)   Wt 110 lb (49.9 kg)   SpO2 94%   BMI 18.30 kg/m²   General appearance:  NAD, appears stated age  Head: NCAT, PERRLA, EOMI, red conjunctiva  Neck: supple, no masses, trachea midline  Lungs: Equal chest rise bilateral, no retractions, no wheezing  Heart: Reg rate  Abdomen: soft, nontender  Skin; warm and dry, no cyanosis  Gu: no cva tenderness  Extremities: Right arm in sling, right clavicle fracture  Psych: No tremor, visual hallucinations        Radiology: I reviewed relevant abdominal imaging from this admission and that available in the EMR including CT head cervical spine and chest    Assessment/Plan:  Opal Gilbert is a 80 y.o. female status post fall with right clavicle fracture cervical spine transverse process fractures, saccular aneurysm, first rib fracture, right humerus fracture  Patient Active Problem List   Diagnosis    Closed fracture of sixth cervical vertebra without spinal cord injury ([facility] [address]       Deep breathing incentive spirometry  Tylenol for pain  Neurosurgery evaluation for new findings of brain aneurysm and cervical spine transverse process fractures  Orthopedic evaluation for right clavicle fracture and humeral head fracture  Physical therapy occupational therapy  Palliative medicine for [facility] work for likely need to placement    I have personally participated in a face-to-face history and physical exam on the date of service.  Reviewed chart, vitals, labs and radiologic studies. I also participated in medical decision making with the resident/NP on the date of service and I agree with all of the pertinent clinical information, assessment and treatment plan. I have reviewed and edited the note above based on my findings during my history, exam, and decision making.        Physician Signature: Electronically signed by Dr. Hadley Finnegan  [phone] (p)  1/4/2021  3:19 PM

## 2021-01-04 NOTE — H&P
[address] [place], [place] [address]ist Group   History and Physical      CHIEF COMPLAINT:  Weakness      History of Present Illness:  80 y.o. female with a history of hyperlipidemia, hypertension  presents with a fall from home. She lives alone. EMS reports that the patient was on the ground for 30 minutes. I am not sure how they were able to ascertain this information. She has extensive bruising showing up already. This leads me to believe that she fell prior to today. In the ER record I see they have past along a history from her neighbor that she has intermittent increasing episodes of confusion. Currently she says that her only family is a sister who was not nearby and the  of a nephew who she wonders if would be able to help her function out of the hospital    Informant(s) for H&P Medical records and patient. REVIEW OF SYSTEMS:  no fevers, chills, cp, sob, n/v, ha, vision/hearing changes, wt changes, hot/cold flashes, other open skin lesions, diarrhea, constipation, dysuria/hematuria unless noted in HPI. Complete ROS performed with the patient and is otherwise negative. PMH:  Past Medical History:   Diagnosis Date    Hyperlipidemia     Hypertension     Thyroid disease        Surgical History:  Past Surgical History:   Procedure Laterality Date    HYSTERECTOMY         Medications Prior to Admission:    Prior to Admission medications    Medication Sig Start Date End Date Taking?  Authorizing Provider   amLODIPine (NORVASC) 5 MG tablet Take 5 mg by mouth daily   Yes Historical Provider, MD   metoprolol succinate (TOPROL XL) 25 MG extended release tablet Take 25 mg by mouth daily   Yes Historical Provider, MD   losartan-hydroCHLOROthiazide (HYZAAR) 100-25 MG per tablet Take 1 tablet by mouth daily   Yes Historical Provider, MD   simvastatin (ZOCOR) 40 MG tablet Take 40 mg by mouth nightly   Yes Historical Provider, MD   levothyroxine (SYNTHROID) 100 MCG tablet Take 100 mcg by mouth Daily   Yes Historical Provider, MD       Allergies:    Pcn [penicillins]    Social History:    reports that she has never smoked. She has never used smokeless tobacco. She reports that she does not drink alcohol. Family History:   family history is not on file. Reviewed and noncontributory    PHYSICAL EXAM:  Vitals:  BP (!) 149/84   Pulse 100   Temp 96.9 °F (36.1 °C) (Axillary)   Resp 16   Ht 5' 5\" (1.651 m)   Wt 110 lb (49.9 kg)   SpO2 94%   BMI 18.30 kg/m²   Ecchymosis on her face chin head and body  General Appearance: alert and oriented to person, place and time and in no acute distress  Skin: warm and dry  Head: normocephalic and atraumatic  Eyes: pupils equal, round, and reactive to light, extraocular eye movements intact, conjunctivae normal  Neck: neck supple and non tender without mass   Pulmonary/Chest: clear to auscultation bilaterally- no wheezes, rales or rhonchi, normal air movement, no respiratory distress  Cardiovascular: normal rate, normal S1 and S2 and no carotid bruits  Abdomen: soft, non-tender, non-distended, normal bowel sounds, no masses or organomegaly  Extremities: no cyanosis, no clubbing and no edema  Neurologic: no cranial nerve deficit and speech normal.  She seems memory impaired  I found her intermediate out of bed trying to get into a chair so the nurses helped relocate her and increase the sensitivity of the bed alarm    LABS:  Recent Labs     01/04/21  0756      K 3.8      CO2 19*   BUN 20   CREATININE 1.1*   GLUCOSE 130*   CALCIUM 9.9       Recent Labs     01/04/21  0756   WBC 13.2*   RBC 4.34   HGB 13.4   HCT 40.1   MCV 92.4   MCH 30.9   MCHC 33.4   RDW 11.9      MPV 9.6       No results for input(s): POCGLU in the last 72 hours.     CBC:   Lab Results   Component Value Date    WBC 13.2 01/04/2021    RBC 4.34 01/04/2021    HGB 13.4 01/04/2021    HCT 40.1 01/04/2021    MCV 92.4 01/04/2021    MCH 30.9 01/04/2021    MCHC 33.4 01/04/2021    RDW 11.9 01/04/2021     01/04/2021    MPV 9.6 01/04/2021     Hemoglobin/Hematocrit:    Lab Results   Component Value Date    HGB 13.4 01/04/2021    HCT 40.1 01/04/2021     Hepatic Function Panel:    Lab Results   Component Value Date    ALKPHOS 97 01/04/2021    ALT 15 01/04/2021    AST 27 01/04/2021    PROT 7.0 01/04/2021    BILITOT 0.6 01/04/2021    LABALBU 3.9 01/04/2021       Radiology: Xr Shoulder Right (min 2 Views)    Result Date: 1/4/2021  EXAMINATION: ONE XRAY VIEW OF THE CHEST; THREE XRAY VIEWS OF THE RIGHT SHOULDER; THREE XRAY VIEWS OF THE LEFT SHOULDER 1/4/2021 10:42 am COMPARISON: None. HISTORY: ORDERING SYSTEM PROVIDED HISTORY: fever TECHNOLOGIST PROVIDED HISTORY: Reason for exam:->fever FINDINGS: Left shoulder is normal.  There is a fracture of the midshaft of the right clavicle which is essentially nondisplaced. There is a suggestion of a nondisplaced fracture of the right humeral head. The humeral head is somewhat subluxed. Heart size is normal.  There is tortuosity of the aorta. There are no infiltrates or effusions. There is no pneumothorax. There are no obvious rib fractures. Essentially nondisplaced fracture of the midshaft of the right clavicle. There is suspicion of a right humeral head fracture with slight subluxation. Recommend obtaining right axillary view if possible. Xr Hip Bilateral W Ap Pelvis (2 Views)    Result Date: 1/4/2021  EXAMINATION: ONE XRAY VIEW OF THE PELVIS AND TWO XRAY VIEWS OF EACH OF THE BILATERAL HIPS 1/4/2021 9:43 am COMPARISON: None. HISTORY: ORDERING SYSTEM PROVIDED HISTORY: fall TECHNOLOGIST PROVIDED HISTORY: Reason for exam:->fall FINDINGS: Motion artifact is somewhat limiting. No definite acute fracture or dislocation. Normal soft tissues. The bones are demineralized. 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 No definite acute fracture or dislocation. 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 Osteopenia.     Ct Head Wo Contrast    Result Date: 1/4/2021  EXAMINATION: CT OF THE HEAD WITHOUT CONTRAST  1/4/2021 8:41 am TECHNIQUE: CT of the head was performed without the administration of intravenous contrast. Dose modulation, iterative reconstruction, and/or weight based adjustment of the mA/kV was utilized to reduce the radiation dose to as low as reasonably achievable. COMPARISON: None. HISTORY: ORDERING SYSTEM PROVIDED HISTORY: fall TECHNOLOGIST PROVIDED HISTORY: Reason for exam:->fall Has a \"code stroke\" or \"stroke alert\" been called? ->No FINDINGS: BRAIN/VENTRICLES: There is no acute intracranial hemorrhage, mass effect or midline shift. No abnormal extra-axial fluid collection. The gray-white differentiation is maintained without evidence of an acute infarct. There is a focal 1.5 cm round, hyperattenuating lesion overlying the right middle cerebral artery (image 25 series 2). There is no evidence of hydrocephalus. ORBITS: The visualized portion of the orbits demonstrate no acute abnormality. SINUSES: Opacification of the right maxillary sinus. The remaining visualized paranasal sinuses and mastoid air cells demonstrate no acute abnormality. SOFT TISSUES/SKULL:  Focal soft tissue prominence overlying the right frontal calvarium with subcutaneous hematoma measuring approximately 4 mm in thickness. Focal 1.5 cm round lesion in in the right middle cerebral artery, likely represents aneurysm. Further evaluation may be obtained with CT angiogram of the head if clinically warranted. No calvarial fractures. No intracranial hemorrhage. Ct Cervical Spine Wo Contrast    Addendum Date: 1/4/2021    ADDENDUM: Abnormal CT findings were conveyed by Dr. Marlen Miller to Dr. Justin Causey via telephone at 8415 hours. Result Date: 1/4/2021  EXAMINATION: CT OF THE CERVICAL SPINE WITHOUT CONTRAST 1/4/2021 8:41 am TECHNIQUE: CT of the cervical spine was performed without the administration of intravenous contrast. Multiplanar reformatted images are provided for review.  Dose modulation, iterative reconstruction, and/or weight based adjustment of the mA/kV was utilized to reduce the radiation dose to as low as reasonably achievable. COMPARISON: None. HISTORY: ORDERING SYSTEM PROVIDED HISTORY: fall TECHNOLOGIST PROVIDED HISTORY: Reason for exam:->fall FINDINGS: The cervical vertebra are normal in height. Acute, nondisplaced fractures of the C6 and C7 transverse processes on the right together with adjacent nondisplaced fracture of the posterior right 1st rib. Additional displaced fracture of the right clavicle. Associated soft tissue swelling at the right neck compatible with hemorrhage and bruising. Intact dens and cranial cervical junction. Facet joint arthritis with grade 1 anterolisthesis of C3 on C4 believed degenerative in origin. Loss of the normal cervical lordosis. Multilevel degenerative changes. C4-5 through C6-7 disc space narrowing accompanied by anterior endplate spurring. Multilevel facet joint arthritis, greatest at the C2-3 and C3-4 levels. No suspicious central canal narrowing. C5-6 and C6-7 bilateral mild uncovertebral spurring. C5-6 8 x 5 mm high density globule at the posterior epidural space to the left of midline compatible with an old Pantopaque droplet or migrated cement from lower level vertebroplasty. No significant central canal narrowing. 1.  Acute, nondisplaced fractures of the C6 and C7 transverse processes on the right and with additional fracture of the right 1st rib and right clavicle. 2.  No evidence of unstable cervical spine fracture or suspicious central canal narrowing. 3.  Multilevel degenerative changes with loss of the normal cervical lordosis. Xr Shoulder Left (min 2 Views)    Result Date: 1/4/2021  EXAMINATION: ONE XRAY VIEW OF THE CHEST; THREE XRAY VIEWS OF THE RIGHT SHOULDER; THREE XRAY VIEWS OF THE LEFT SHOULDER 1/4/2021 10:42 am COMPARISON: None.  HISTORY: ORDERING SYSTEM PROVIDED HISTORY: fever TECHNOLOGIST PROVIDED HISTORY: Reason for exam:->fever FINDINGS: Left shoulder is normal.  There is a fracture of the midshaft of the right clavicle which is essentially nondisplaced. There is a suggestion of a nondisplaced fracture of the right humeral head. The humeral head is somewhat subluxed. Heart size is normal.  There is tortuosity of the aorta. There are no infiltrates or effusions. There is no pneumothorax. There are no obvious rib fractures. Essentially nondisplaced fracture of the midshaft of the right clavicle. There is suspicion of a right humeral head fracture with slight subluxation. Recommend obtaining right axillary view if possible. Xr Chest 1 View    Result Date: 1/4/2021  EXAMINATION: ONE XRAY VIEW OF THE CHEST; THREE XRAY VIEWS OF THE RIGHT SHOULDER; THREE XRAY VIEWS OF THE LEFT SHOULDER 1/4/2021 10:42 am COMPARISON: None. HISTORY: ORDERING SYSTEM PROVIDED HISTORY: fever TECHNOLOGIST PROVIDED HISTORY: Reason for exam:->fever FINDINGS: Left shoulder is normal.  There is a fracture of the midshaft of the right clavicle which is essentially nondisplaced. There is a suggestion of a nondisplaced fracture of the right humeral head. The humeral head is somewhat subluxed. Heart size is normal.  There is tortuosity of the aorta. There are no infiltrates or effusions. There is no pneumothorax. There are no obvious rib fractures. Essentially nondisplaced fracture of the midshaft of the right clavicle. There is suspicion of a right humeral head fracture with slight subluxation. Recommend obtaining right axillary view if possible. Cta Head W Contrast    Result Date: 1/4/2021  EXAMINATION: CTA OF THE HEAD WITH CONTRAST 1/4/2021 9:53 am: TECHNIQUE: CTA of the head/brain was performed with the administration of intravenous contrast. Multiplanar reformatted images are provided for review. MIP images are provided for review.  Dose modulation, iterative reconstruction, and/or weight based adjustment of the mA/kV was utilized to reduce the radiation dose to as low as reasonably achievable. COMPARISON: Noncontrast CT head from earlier today HISTORY: ORDERING SYSTEM PROVIDED HISTORY: right mca aneurysm on ct head TECHNOLOGIST PROVIDED HISTORY: Reason for exam:->right mca aneurysm on ct head Has a \"code stroke\" or \"stroke alert\" been called? ->No FINDINGS: ANTERIOR CIRCULATION: The A1 segment of the right TREASURE is hypoplastic. No significant stenosis of the intracranial internal carotid, anterior cerebral, or middle cerebral arteries. There is a 1.2 x 1.5 x 1.3 cm (transverse by AP by craniocaudal) saccular aneurysm at the superior aspect of the supraclinoid segment of right internal carotid artery. POSTERIOR CIRCULATION: There is fetal origin of the right PCA, normal variant. No significant stenosis of the vertebral, basilar, or posterior cerebral arteries. No aneurysm. OTHER: No dural venous sinus thrombosis on this non-dedicated study. BRAIN: No mass effect or midline shift. No extra-axial fluid collection. The gray-white differentiation is maintained. There is opacification of the right maxillary sinus, likely related to sinusitis. 1.2 x 1.5 x 1.3 cm large saccular aneurysm at the superior aspect of the supraclinoid segment of right internal carotid artery. Otherwise, no acute abnormality or flow-limiting stenosis in the remainder of the major arteries of the head. EKG: Sinus arrhythmia incomplete left bundle branch block ST-T wave abnormality  No previous to compare    ASSESSMENT:      Active Problems:    Closed fracture of sixth cervical vertebra without spinal cord injury ([address]  Resolved Problems:    * No resolved hospital problems. *      PLAN:  ua (-); wbc 13.2    1. Falls and confusion at home possibly consistent with weakness and dementia  She will need case management to help place her versus home with family and home health care. PT OT.   Check b12, folate, tsh  2.  C6 and C7 transverse processes on the right and with

## 2021-01-04 NOTE — ED NOTES
Bed: 16  Expected date:   Expected time:   Means of arrival:   Comments:  Juan A Arauz RN  01/04/21 0046

## 2021-01-05 ENCOUNTER — APPOINTMENT (OUTPATIENT)
Dept: CT IMAGING | Age: 86
DRG: 552 | End: 2021-01-05
Payer: MEDICARE

## 2021-01-05 LAB
ANION GAP SERPL CALCULATED.3IONS-SCNC: 13 MMOL/L (ref 7–16)
BASOPHILS ABSOLUTE: 0.02 E9/L (ref 0–0.2)
BASOPHILS RELATIVE PERCENT: 0.2 % (ref 0–2)
BUN BLDV-MCNC: 27 MG/DL (ref 8–23)
CALCIUM SERPL-MCNC: 9.1 MG/DL (ref 8.6–10.2)
CHLORIDE BLD-SCNC: 101 MMOL/L (ref 98–107)
CO2: 23 MMOL/L (ref 22–29)
CREAT SERPL-MCNC: 1.1 MG/DL (ref 0.5–1)
EOSINOPHILS ABSOLUTE: 0.01 E9/L (ref 0.05–0.5)
EOSINOPHILS RELATIVE PERCENT: 0.1 % (ref 0–6)
GFR AFRICAN AMERICAN: 56
GFR NON-AFRICAN AMERICAN: 46 ML/MIN/1.73
GLUCOSE BLD-MCNC: 124 MG/DL (ref 74–99)
HCT VFR BLD CALC: 33.8 % (ref 34–48)
HEMOGLOBIN: 11.5 G/DL (ref 11.5–15.5)
IMMATURE GRANULOCYTES #: 0.04 E9/L
IMMATURE GRANULOCYTES %: 0.4 % (ref 0–5)
LYMPHOCYTES ABSOLUTE: 1.8 E9/L (ref 1.5–4)
LYMPHOCYTES RELATIVE PERCENT: 19 % (ref 20–42)
MCH RBC QN AUTO: 30.7 PG (ref 26–35)
MCHC RBC AUTO-ENTMCNC: 34 % (ref 32–34.5)
MCV RBC AUTO: 90.1 FL (ref 80–99.9)
MONOCYTES ABSOLUTE: 0.83 E9/L (ref 0.1–0.95)
MONOCYTES RELATIVE PERCENT: 8.8 % (ref 2–12)
NEUTROPHILS ABSOLUTE: 6.76 E9/L (ref 1.8–7.3)
NEUTROPHILS RELATIVE PERCENT: 71.5 % (ref 43–80)
PDW BLD-RTO: 12 FL (ref 11.5–15)
PLATELET # BLD: 258 E9/L (ref 130–450)
PMV BLD AUTO: 9.8 FL (ref 7–12)
POTASSIUM SERPL-SCNC: 3.9 MMOL/L (ref 3.5–5)
RBC # BLD: 3.75 E12/L (ref 3.5–5.5)
SODIUM BLD-SCNC: 137 MMOL/L (ref 132–146)
WBC # BLD: 9.5 E9/L (ref 4.5–11.5)

## 2021-01-05 PROCEDURE — 6370000000 HC RX 637 (ALT 250 FOR IP): Performed by: INTERNAL MEDICINE

## 2021-01-05 PROCEDURE — 80048 BASIC METABOLIC PNL TOTAL CA: CPT

## 2021-01-05 PROCEDURE — 70498 CT ANGIOGRAPHY NECK: CPT

## 2021-01-05 PROCEDURE — 6360000002 HC RX W HCPCS: Performed by: INTERNAL MEDICINE

## 2021-01-05 PROCEDURE — 97161 PT EVAL LOW COMPLEX 20 MIN: CPT | Performed by: PHYSICAL THERAPIST

## 2021-01-05 PROCEDURE — 6360000004 HC RX CONTRAST MEDICATION: Performed by: RADIOLOGY

## 2021-01-05 PROCEDURE — 99232 SBSQ HOSP IP/OBS MODERATE 35: CPT | Performed by: INTERNAL MEDICINE

## 2021-01-05 PROCEDURE — 97116 GAIT TRAINING THERAPY: CPT | Performed by: PHYSICAL THERAPIST

## 2021-01-05 PROCEDURE — 2580000003 HC RX 258: Performed by: INTERNAL MEDICINE

## 2021-01-05 PROCEDURE — 70486 CT MAXILLOFACIAL W/O DYE: CPT

## 2021-01-05 PROCEDURE — 99221 1ST HOSP IP/OBS SF/LOW 40: CPT | Performed by: FAMILY MEDICINE

## 2021-01-05 PROCEDURE — 99232 SBSQ HOSP IP/OBS MODERATE 35: CPT | Performed by: SURGERY

## 2021-01-05 PROCEDURE — 6370000000 HC RX 637 (ALT 250 FOR IP): Performed by: STUDENT IN AN ORGANIZED HEALTH CARE EDUCATION/TRAINING PROGRAM

## 2021-01-05 PROCEDURE — 85025 COMPLETE CBC W/AUTO DIFF WBC: CPT

## 2021-01-05 PROCEDURE — 97530 THERAPEUTIC ACTIVITIES: CPT

## 2021-01-05 PROCEDURE — 97165 OT EVAL LOW COMPLEX 30 MIN: CPT

## 2021-01-05 PROCEDURE — 36415 COLL VENOUS BLD VENIPUNCTURE: CPT

## 2021-01-05 PROCEDURE — 1200000000 HC SEMI PRIVATE

## 2021-01-05 RX ORDER — LEVOTHYROXINE SODIUM 88 UG/1
88 TABLET ORAL DAILY
Status: DISCONTINUED | OUTPATIENT
Start: 2021-01-06 | End: 2021-01-06 | Stop reason: HOSPADM

## 2021-01-05 RX ORDER — SENNA AND DOCUSATE SODIUM 50; 8.6 MG/1; MG/1
2 TABLET, FILM COATED ORAL DAILY PRN
Status: DISCONTINUED | OUTPATIENT
Start: 2021-01-05 | End: 2021-01-06 | Stop reason: HOSPADM

## 2021-01-05 RX ORDER — DIPHENHYDRAMINE HCL 25 MG
50 TABLET ORAL ONCE
Status: COMPLETED | OUTPATIENT
Start: 2021-01-05 | End: 2021-01-05

## 2021-01-05 RX ORDER — DOCUSATE SODIUM 100 MG/1
100 CAPSULE, LIQUID FILLED ORAL 2 TIMES DAILY
Status: DISCONTINUED | OUTPATIENT
Start: 2021-01-05 | End: 2021-01-06 | Stop reason: HOSPADM

## 2021-01-05 RX ADMIN — Medication 10 ML: at 12:46

## 2021-01-05 RX ADMIN — Medication 10 ML: at 21:00

## 2021-01-05 RX ADMIN — ACETAMINOPHEN 650 MG: 325 TABLET, FILM COATED ORAL at 12:46

## 2021-01-05 RX ADMIN — DOCUSATE SODIUM 100 MG: 100 CAPSULE ORAL at 12:46

## 2021-01-05 RX ADMIN — LEVOTHYROXINE SODIUM 100 MCG: 0.1 TABLET ORAL at 05:39

## 2021-01-05 RX ADMIN — DOCUSATE SODIUM 100 MG: 100 CAPSULE ORAL at 20:44

## 2021-01-05 RX ADMIN — METOPROLOL SUCCINATE 25 MG: 25 TABLET, EXTENDED RELEASE ORAL at 12:46

## 2021-01-05 RX ADMIN — IOPAMIDOL 75 ML: 755 INJECTION, SOLUTION INTRAVENOUS at 11:21

## 2021-01-05 RX ADMIN — ACETAMINOPHEN 650 MG: 325 TABLET, FILM COATED ORAL at 20:44

## 2021-01-05 RX ADMIN — AMLODIPINE BESYLATE 5 MG: 5 TABLET ORAL at 12:46

## 2021-01-05 RX ADMIN — ATORVASTATIN CALCIUM 10 MG: 10 TABLET, FILM COATED ORAL at 12:46

## 2021-01-05 RX ADMIN — ENOXAPARIN SODIUM 30 MG: 30 INJECTION SUBCUTANEOUS at 12:46

## 2021-01-05 RX ADMIN — DIPHENHYDRAMINE HCL 50 MG: 25 TABLET ORAL at 21:55

## 2021-01-05 ASSESSMENT — PAIN SCALES - GENERAL: PAINLEVEL_OUTOF10: 3

## 2021-01-05 NOTE — PROGRESS NOTES
[address] [place], [place] [address]ist   Progress Note    Admitting Date and Time: 1/4/2021  7:20 AM  Admit Dx: Closed fracture of sixth cervical vertebra without spinal cord injury, initial encounter ([facility] [address] [S12.500A]  Weakness [R53.1]    Subjective:    Pt feels anxious and asks orienting questions  Per RN: No issues    ROS: denies fever, chills, cp, sob, n/v, HA unless stated above.  docusate sodium  100 mg Oral BID    sodium chloride flush  10 mL Intravenous 2 times per day    enoxaparin  30 mg Subcutaneous Daily    amLODIPine  5 mg Oral Daily    levothyroxine  100 mcg Oral Daily    atorvastatin  10 mg Oral Daily    metoprolol succinate  25 mg Oral Daily         sennosides-docusate sodium, 2 tablet, Daily PRN      sodium chloride flush, 10 mL, PRN      promethazine, 12.5 mg, Q6H PRN    Or      ondansetron, 4 mg, Q6H PRN      polyethylene glycol, 17 g, Daily PRN      acetaminophen, 650 mg, Q6H PRN    Or      acetaminophen, 650 mg, Q6H PRN         Objective:    BP (!) 144/67   Pulse 88   Temp 98 °F (36.7 °C) (Oral)   Resp 16   Ht 5' 5\" (1.651 m)   Wt 110 lb (49.9 kg)   SpO2 98%   BMI 18.30 kg/m²   General Appearance: alert and oriented to person, place and time and in no acute distress  Skin: warm and dry  Head: normocephalic and atraumatic  Eyes: pupils equal, round, and reactive to light, extraocular eye movements intact, conjunctivae normal  Neck: neck supple and non tender without mass   Pulmonary/Chest: clear to auscultation bilaterally- no wheezes, rales or rhonchi, normal air movement, no respiratory distress  Cardiovascular: normal rate, normal S1 and S2 and no carotid bruits  Abdomen: soft, non-tender, non-distended, normal bowel sounds, no masses or organomegaly  Extremities: no cyanosis, no clubbing and no edema  Neurologic: no cranial nerve deficit and speech normal.  Memory impaired.   Somewhat anxious but redirectable by me and the sitter  Right arm in sling but she forgets and moves it which then elicits mild pain. Ecchymosis throughout face chin and upper body      Recent Labs     01/04/21  0756 01/05/21  0414    137   K 3.8 3.9    101   CO2 19* 23   BUN 20 27*   CREATININE 1.1* 1.1*   GLUCOSE 130* 124*   CALCIUM 9.9 9.1       Recent Labs     01/04/21  0756   ALKPHOS 97   PROT 7.0   LABALBU 3.9   BILITOT 0.6   AST 27   ALT 15       Recent Labs     01/04/21  0756 01/05/21  0414   WBC 13.2* 9.5   RBC 4.34 3.75   HGB 13.4 11.5   HCT 40.1 33.8*   MCV 92.4 90.1   MCH 30.9 30.7   MCHC 33.4 34.0   RDW 11.9 12.0    258   MPV 9.6 9.8       CBC:   Lab Results   Component Value Date    WBC 9.5 01/05/2021    RBC 3.75 01/05/2021    HGB 11.5 01/05/2021    HCT 33.8 01/05/2021    MCV 90.1 01/05/2021    MCH 30.7 01/05/2021    MCHC 34.0 01/05/2021    RDW 12.0 01/05/2021     01/05/2021    MPV 9.8 01/05/2021     CMP:    Lab Results   Component Value Date     01/05/2021    K 3.9 01/05/2021    K 3.8 01/04/2021     01/05/2021    CO2 23 01/05/2021    BUN 27 01/05/2021    CREATININE 1.1 01/05/2021    GFRAA 56 01/05/2021    LABGLOM 46 01/05/2021    GLUCOSE 124 01/05/2021    PROT 7.0 01/04/2021    LABALBU 3.9 01/04/2021    CALCIUM 9.1 01/05/2021    BILITOT 0.6 01/04/2021    ALKPHOS 97 01/04/2021    AST 27 01/04/2021    ALT 15 01/04/2021        Radiology:   CTA NECK W CONTRAST   Final Result   Unremarkable CTA of the neck without stenosis or occlusion. No traumatic   injury of the vessels is appreciated. Stable previously described cervical spine fractures. CT FACIAL BONES WO CONTRAST   Final Result   Complete opacification of the right maxillary sinus likely reflecting chronic   sinusitis   no acute traumatic injury of the facial bones. Calcified focus at the posterior epidural space C5-C6 level. Could reflect a   heavily calcified meningioma or dystrophic calcification.       XR SHOULDER LEFT (MIN 2 VIEWS)   Final Result   Essentially nondisplaced fracture of the midshaft of the right clavicle. There is suspicion of a right humeral head fracture with slight subluxation. Recommend obtaining right axillary view if possible. XR HIP BILATERAL W AP PELVIS (2 VIEWS)   Final Result   No definite acute fracture or dislocation. 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 Osteopenia. XR SHOULDER RIGHT (MIN 2 VIEWS)   Final Result   Essentially nondisplaced fracture of the midshaft of the right clavicle. There is suspicion of a right humeral head fracture with slight subluxation. Recommend obtaining right axillary view if possible. XR CHEST 1 VIEW   Final Result   Essentially nondisplaced fracture of the midshaft of the right clavicle. There is suspicion of a right humeral head fracture with slight subluxation. Recommend obtaining right axillary view if possible. CTA HEAD W CONTRAST   Final Result   1.2 x 1.5 x 1.3 cm large saccular aneurysm at the superior aspect of the   supraclinoid segment of right internal carotid artery. Otherwise, no acute abnormality or flow-limiting stenosis in the remainder of   the major arteries of the head. CT Head WO Contrast   Final Result   Focal 1.5 cm round lesion in in the right middle cerebral artery, likely   represents aneurysm. Further evaluation may be obtained with CT angiogram of   the head if clinically warranted. No calvarial fractures. No intracranial hemorrhage. CT Cervical Spine WO Contrast   Final Result   Addendum 1 of 1   ADDENDUM:   Abnormal CT findings were conveyed by Dr. Adams Mendiola to Dr. Genevieve Baker via   telephone at 1763 hours. Final          Assessment:  Active Problems:    Closed fracture of sixth cervical vertebra without spinal cord injury ([address]    Weakness    Fall  Resolved Problems:    * No resolved hospital problems. *      Plan:  1.  Falls and confusion at home possibly consistent with weakness and dementia  She will need case management to help place her versus home with family and home health care. PT OT.  B12 and folate within normal limits  2. C6 and C7 transverse processes on the right and with additional fracture of the right 1st rib and right clavicle. radiology: nondisplaced fracture of the midshaft of the right clavicle. There is suspicion of a right humeral head fracture with slight subluxation. Recommend obtaining right axillary view if possible.    Trauma is waiting for results from CT face and CTA neck. Then once cleared by physical therapy and orthopedics they will be okay with discharge    3. Questionable EKG abnormalities with no previous comparison could have outpatient cardiac follow-up depending on her and her family's wishes. Her advanced age must be taken into account  4. Code Status: I am hoping case management could determine if there is a POA who knows if she has other advanced directives   5. Hypothyroidism: Home dose of Synthroid is 100 MCG's per day it is hard to know if she was under or over taking this. Her TSH is suppressed below detection levels therefore I will decrease Synthroid to 88. She will need very close follow-up and have this rechecked in 6 weeks. 6. 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 Dispo to rehab likely on 1/6       NOTE: This report was transcribed using voice recognition software. Every effort was made to ensure accuracy; however, inadvertent computerized transcription errors may be present.      Electronically signed by Alida Zapien MD on 1/5/2021 at 2:00 PM

## 2021-01-05 NOTE — CONSULTS
Palliative Care Department  [phone]  Palliative Care Initial Consult  Provider [address] [facility]  [id number]  Hospital Day: 25  Date of Initial Consult: 1/5/2021  Referring Provider: Brandon Whitten MD  Palliative Medicine was consulted for assistance with: goals of care, code status discussion    HPI:   Ari Ernandez is a 80 y.o. with a medical history of HLD, HTN who was admitted on 1/4/2021 from home with a CHIEF COMPLAINT of fall. ASSESSMENT/PLAN:     Pertinent Hospital Diagnoses      Fall at home with C6 and C7 transverse process fracture, right 1st rib fracture and right clavicle fracture      Palliative Care Encounter / Counseling Regarding Goals of Care  Please see detailed goals of care discussion as below   At this time, Ari Ernandez, Does Not have capacity for medical decision-making. Capacity is time limited and situation/question specific   During encounter Efraín Ortiz was surrogate medical decision-maker   Outcome of goals of care meeting: no intubation, no CPR, ok to go to rehab   Code status Limited no intubation no CPR   Advanced Directives: no POA or living will in epic   Surrogate/Legal NOK:  ninfa Wells [phone]  o Yvan Ceballos [phone]  o [facility] [phone]    Spiritual assessment: no spiritual distress identified  Bereavement and grief: to be determined  Referrals to: none today  SUBJECTIVE:     Current medical issues leading to Palliative Medicine involvement include   Active Hospital Problems    Diagnosis Date Noted    Closed fracture of sixth cervical vertebra without spinal cord injury ([facility] [address] [S12.500A] 01/04/2021    Weakness [R53.1] 01/04/2021       Details of Conversation:  Introduced self and PM to patient and called Nery Rider on the phone whom patient identified as her healthcare decision maker. Discussed need for rehab, which Nery Rider agrees to.   Also discussed code status options, Nery Rider states patient has DNR, would not want intubated, ok with being hospitalized. Physical Function:  PPS: 50       OBJECTIVE:   Prognosis: unknown, Poor and Guarded    Physical Exam:  BP (!) 144/67   Pulse 88   Temp 98 °F (36.7 °C) (Oral)   Resp 16   Ht 5' 5\" (1.651 m)   Wt 110 lb (49.9 kg)   SpO2 98%   BMI 18.30 kg/m²   Constitutional:  Elderly, thin, NAD, awake, alert, sitting in chair  Eyes: no scleral icterus, normal lids, no discharge  ENMT:  Bruising around both eyes, mucosa moist, EOMI  Neck:  trachea midline, no JVD  Lungs:  CTA bilaterally, no audible rhonchi or wheezes noted, respirations unlabored, no retractions  Heart[de-identified]  RRR, distant heart tones, no murmur, rub, or gallop noted during exam  Abd:  Soft, non tender, non distended, bowel sounds present  : deferred  MSK: sarcopenia present  Ext:  Moving all extremities, no edema, pulses present  Skin:  Warm and dry, no rashes on visible skin, ecchymosis on face around eyes  Psych: anxious affect  Neuro:  PERRL, Alert, grossly nonfocal; following commands, oriented to self    Objective data reviewed: labs, images, records, medication use, vitals and chart    Discussed patient and the plan of care with the other IDT members: Palliative Medicine IDT Team, Floor Nurse, Patient and Family    Time/Communication  Greater than 50% of time spent, total 30 minutes in counseling and coordination of care at the bedside regarding goals of care, diagnosis and prognosis and see above. Thank you for allowing Palliative Medicine to participate in the care of [address].

## 2021-01-05 NOTE — PROGRESS NOTES
Physical Therapy    Physical Therapy Initial Evaluation    Room #:  0330/0330-02  Patient Name: Crystal Segundo  YOB: 1929  MRN: [id number]    Referring Provider:    Kamron Ye MD    Date of Service: 1/5/2021    Evaluating Physical Therapist: Jose Eduardo Saey, PT  #[id number]       Diagnosis:   Closed fracture of sixth cervical vertebra without spinal cord injury, initial encounter [facility]) [S12.500A]  Weakness [R53.1]    Admitted with   Fall and confusion and right shoulder pain  Xrays:   Acute, nondisplaced fractures of the C6 and C7 transverse processes on the right and with additional fracture of the right 1st rib and right clavicle   There is a suggestion of a nondisplaced fracture of the right humeral head. The humeral head is somewhat subluxed  Ortho cleared for p.t. Patient Active Problem List   Diagnosis    Closed fracture of sixth cervical vertebra without spinal cord injury ([facility] [address]    Weakness        Tentative placement recommendation: [facility] with Physical Therapy     Equipment recommendation:  To be determined      Prior Level of Function: Patient ambulated independently  with cane or wheeled walker   Rehab Potential: good  for baseline    Past medical history:   Past Medical History:   Diagnosis Date    Hyperlipidemia     Hypertension     Thyroid disease      Past Surgical History:   Procedure Laterality Date    HYSTERECTOMY         Precautions: , falls and alarm ,  non weight bearing Right upper extremitiy , sling    SUBJECTIVE:    Social history: Patient lives alone       Equipment owned: [address],       [id number] [facility] Mobility Inpatient   How much difficulty turning over in bed?: A Little  How much difficulty sitting down on / standing up from a chair with arms?: A Little  How much difficulty moving from lying on back to sitting on side of bed?: A Little  How much help from another person moving to and from a bed to a chair?: A Little  How much help from another person needed to walk in hospital room?: A Little  How much help from another person for climbing 3-5 steps with a railing?: A Lot  AM-PAC Inpatient Mobility Raw Score : 17  AM-PAC Inpatient T-Scale Score : 42.13  Mobility Inpatient CMS 0-100% Score: 50.57  Mobility Inpatient CMS G-Code Modifier : CK    Nursing cleared patient for PT evaluation. The admitting diagnosis and active problem list as listed above have been reviewed prior to the initiation of this evaluation. OBJECTIVE;   Initial Evaluation  Date: 1/5/21 Treatment Date:     Short Term/ Long Term   Goals   Was pt agreeable to Eval/treatment? Yes    To be met in 3 days   Pain level   3/10  right elbow     Bed Mobility    Rolling: Minimal assist of 1    Supine to sit: Minimal assist of 1    Sit to supine: Minimal assist of 1    Scooting: Minimal assist of 1    Rolling: Independent    Supine to sit:  Independent    Sit to supine: Independent    Scooting: Independent     Transfers Sit to stand: Minimal assist of 1 d/t unsteadiness  Sit to stand: Independent     Ambulation    4x75 feet using  hand held assist with Moderate assist of 1   for loss of balance and poor attention to environment   100 feet using  cane with Supervision     Stair negotiation: ascended and descended   5 steps with rail on left mod a for balance and cues for rail use and sequencing; using reciprocating pattern despite cuing which increases risk for fall       5 steps with    ROM Within functional limits with exception of Right upper extremitiy in sling      Strength LUE:  3/5  RLE:  3/5  LLE:  3/5   Increase strength in affected mm groups by 1/3 grade   Balance Sitting EOB:  fair    Dynamic Standing:  poor    Sitting EOB:  good    Dynamic Standing: fair with cane     Patient is Alert & Oriented x person and place and follows one step directions  inconsistently  Sensation:  Patient  denies numbness and tingling      Edema:  no    Endurance: fair Patient education  Patient educated on role of Physical Therapy, risks of immobility, safety and plan of care, energy conservation and  importance of mobility while in hospital       Patient response to education:   Pt verbalized understanding Pt demonstrated skill Pt requires further education in this area   No No Yes      Treatment:  Patient practiced and was instructed/facilitated in the following treatment: Patient   Sat edge of bed 10 minutes with Supervision  to increase dynamic sitting balance and activity tolerance. seated and standing challenges     Therapeutic Exercises:  not performed      At end of session, patient in chair with alarm call light and phone within reach,   all lines and tubes intact, nursing notified. Patient would benefit from continued skilled Physical Therapy to improve functional independence and quality of life. Patient's/ family goals   home        ASSESSMENT: Patient exhibits decreased strength, balance, coordination impairing functional mobility.  Decreased strength and balance are barriers to d/c and require skilled intervention during hospital stay    Pt unable to be alone d/t balance deficit and increased risk for fall       Plan of Care:     -Standing Balance: Perform strengthening exercises in standing to promote motor control with or without upper extremity support  and Instruct patient on adequate base of support to maintain balance  -Transfers: Provide instruction on proper hand and foot position for adequate transfer of weight onto lower extremities and use of gait device, Cues for hand placement, technique and safety and Provide stabilization to prevent fall   -Gait: Gait training, Standing activities to improve: base of support, weight shift, weight bearing  and Exercises to improve hip and knee control   -Endurance: Utilize Supervised activities to increase level of endurance to allow for safe functional mobility including transfers and gait -Stairs: Stair training with instruction on proper technique and hand placement on rail    Patient and or family understand(s) diagnosis, prognosis, and plan of care. Frequency of treatments: Patient will be seen 3-5      Time in  936  Time out  952    Total Treatment Time  8 minutes    Evaluation time includes thorough review of current medical information, gathering information on past medical history/social history and prior level of function, completion of standardized testing/informal observation of tasks, assessment of data, and development of Plan of care and goals.      CPT codes:  Low Complexity PT evaluation (80734)  Gait Training (22280) 8 minutes 1 unit(s)    Lilibeth Smith, PT

## 2021-01-05 NOTE — CARE COORDINATION
CM note: Spoke with patient's Dorinda Silvestre for transition of care planning as patient is only oriented to herself. Lonnie Eubanks states that patient lives at home alone, [facility] was providing assistance for 5 hours a day but patient needs even more assistance than that as she has been making mistakes with her medications, has not been eating or bathing. POA states she is not able to provide care as she works full time. POA would like patient to go to rehab, CM reviewed options and POA would like a referral to [facility] or [facility]. Referral given to [facility] and we await acceptance. NO PRECERT needed but will NEED neg covid test, a completed HENS and a signed HIEN for transfer if accepted.

## 2021-01-05 NOTE — PROGRESS NOTES
Trauma Tertiary Survey    Admit Date: 1/4/2021  Hospital day 1    CC: Found down       Past Medical History:   Diagnosis Date    Hyperlipidemia     Hypertension     Thyroid disease        Alcohol pre-screening:  Women: How many times in the past year have you had 4 or more drinks in a day? none    Scheduled Meds:   sodium chloride flush  10 mL Intravenous 2 times per day    enoxaparin  30 mg Subcutaneous Daily    amLODIPine  5 mg Oral Daily    levothyroxine  100 mcg Oral Daily    atorvastatin  10 mg Oral Daily    metoprolol succinate  25 mg Oral Daily     Continuous Infusions:  PRN Meds:sodium chloride flush, promethazine **OR** ondansetron, polyethylene glycol, acetaminophen **OR** acetaminophen    Subjective:     Confused, only needed 2 tylenol overnight, wants to go home    Objective:     Patient Vitals for the past 8 hrs:   BP Temp Temp src Pulse Resp SpO2   01/05/21 0610 (!) 144/67 98 °F (36.7 °C) Oral 88 16 98 %   01/04/21 2300 -- -- -- 72 -- --       I/O last 3 completed shifts: In: 320 [P.O.:320]  Out: -   I/O this shift:  In: -   Out: 400 [Urine:400]    Past Medical History:   Diagnosis Date    Hyperlipidemia     Hypertension     Thyroid disease        Radiology:  XR SHOULDER LEFT (MIN 2 VIEWS)   Final Result   Essentially nondisplaced fracture of the midshaft of the right clavicle. There is suspicion of a right humeral head fracture with slight subluxation. Recommend obtaining right axillary view if possible. XR HIP BILATERAL W AP PELVIS (2 VIEWS)   Final Result   No definite acute fracture or dislocation. 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 Osteopenia. XR SHOULDER RIGHT (MIN 2 VIEWS)   Final Result   Essentially nondisplaced fracture of the midshaft of the right clavicle. There is suspicion of a right humeral head fracture with slight subluxation. Recommend obtaining right axillary view if possible.       XR CHEST 1 VIEW   Final Result   Essentially nondisplaced fracture of the midshaft of the right clavicle. There is suspicion of a right humeral head fracture with slight subluxation. Recommend obtaining right axillary view if possible. CTA HEAD W CONTRAST   Final Result   1.2 x 1.5 x 1.3 cm large saccular aneurysm at the superior aspect of the   supraclinoid segment of right internal carotid artery. Otherwise, no acute abnormality or flow-limiting stenosis in the remainder of   the major arteries of the head. CT Head WO Contrast   Final Result   Focal 1.5 cm round lesion in in the right middle cerebral artery, likely   represents aneurysm. Further evaluation may be obtained with CT angiogram of   the head if clinically warranted. No calvarial fractures. No intracranial hemorrhage. CT Cervical Spine WO Contrast   Final Result   Addendum 1 of 1   ADDENDUM:   Abnormal CT findings were conveyed by Dr. Lorna Sidhu to Dr. Felicitas Cary via   telephone at 6843 hours. Final          PHYSICAL EXAM:   GCS:  4 - Opens eyes on own   6 - Follows simple motor commands  4 - Confused, disoriented    Pupil size:  Left 4 mm Right 4 mm  Pupil reaction: Yes  Wiggles fingers: Left Yes Right Yes  Hand grasp:   Left normal   Right normal  Wiggles toes: Left Yes    Right Yes  Plantar flexion: Left normal  Right normal    PHYSICAL EXAM  General: No apparent distress, comfortable   HEENT: Trachea midline, no masses, Pupils equal round, multiple facial ecchymosis  Chest: Respiratory effort was normal with no retractions or use of accessory muscles. Cardiovascular: Extremities warm, well perfused,   Abdomen:  Soft and non distended.   No tenderness, guarding, rebound, or rigidity   Extremities: Moves all 4 extremeties, No pedal edema, R arm in sling    Spine:     Spine Tenderness ROM   Cervical 0 /10 Normal   Thoracic 0 /10 Normal   Lumbar 0 /10 Normal     Musculoskeletal    Joint Tenderness Swelling ROM   Right shoulder present absent normal   Left shoulder absent absent normal   Right elbow absent absent normal   Left elbow absent absent normal   Right wrist absent absent normal   Left wrist absent absent normal   Right hand grasp absent absent normal   Left hand grasp absent absent normal   Right hip absent absent normal   Left hip absent absent normal   Right knee absent absent normal   Left knee absent absent normal   Right ankle absent absent normal   Left ankle absent absent normal   Right foot absent absent normal   Left foot absent absent normal       CONSULTS: Orthopedic surgery. palliatie    PROCEDURES: none    INJURIES:        Active Problems:    Closed fracture of sixth cervical vertebra without spinal cord injury ([facility] [address]    Weakness  Resolved Problems:    * No resolved hospital problems. *        Assessment/Plan:       · Neuro:  GCS 14, dementia, minimize narcotics, encourage ambulation and frequent reorientation, sitter at bedside, recommend no benzos  · CV: HR near normal limits, no acute issues, aneurysm at ZACHARY  · Pulm: tolerating room air, SMI 1000, 1st rib fracture  · GI: tolerating general diet   · Renal: mild RADAH  · ID: afebrile, no acute issues, leukocytosis normalized  · Endocrine: no acute issues,   · MSK: R arm in sling, C6/7 fracture, previous humerus fracture, ortho following, PT/OT recommended, bedrest order discontinued  · Heme: no acute issues      Bowel regime: colace, MOM   Pain control/Sedation: tylenol  DVT prophylaxis: SCDs, ok to start sqh    GI: diet   Glucose protocol: per pt  Mouth/Eye care: per patient. Ruiz: none     Code status:    Full Code-palliative ordered    Patient/Family update:  As available     Disposition:  Pending primary if CT head/CTA neck wnl      Electronically signed by Nadia Manzano MD on 1/5/21 at 6:19 AM EST    General Surgery Consult Note  Pawan Perea MD, MS    Patient's Name/Date of Birth:  Germaine Arroyo / 3/31/1929    Date: January 5, 2021     Surgeon: Marci Paris MD    Requesting Physician:      conjunctiva, facial ecchymosis appears improved   neck: supple, no masses, trachea midline  Lungs: Equal chest rise bilateral, SMI thousand  Heart: Reg rate  Abdomen: soft, nontender  Skin; warm and dry, no cyanosis  Gu: no cva tenderness  Extremities: Right arm in sling  Psych: No tremor, visual hallucinations        Radiology: I reviewed relevant abdominal imaging from this admission and that available in the EMR    Assessment/Plan:  Zoila Palomares is a 80 y.o. female multiple bony injuries following a fall  Patient Active Problem List   Diagnosis    Closed fracture of sixth cervical vertebra without spinal cord injury ([facility] [address]    Weakness       Await CT face and CTA neck  Pending findings of CT examination and clearance with physical therapy and orthopedics would be okay for discharge from trauma standpoint once home care and safety in the home is established  No further trauma plans  Tylenol for pain  I have personally participated in a face-to-face history and physical exam on the date of service. Reviewed chart, vitals, labs and radiologic studies. I also participated in medical decision making with the resident/NP on the date of service and I agree with all of the pertinent clinical information, assessment and treatment plan. I have reviewed and edited the note above based on my findings during my history, exam, and decision making.        Physician Signature: Electronically signed by Dr. Neto Fang  [phone] (p)  1/5/2021  12:45 PM

## 2021-01-05 NOTE — PROGRESS NOTES
OCCUPATIONAL THERAPY  Initial Evaluation  Date:2021  Patient Name: Edin Lassiter  MRN: [id number]  : 3/31/1929  ROOM #: 0330/0330-02     Referring Provider: Rogerio Palmer MD  Evaluating OT: Harriett Bowens OTR/L [id number]    Placement Recommendation: Subacute    Recommended Adaptive Equipment: TBD at rehab     [facility]   AM-PAC Daily Activity Inpatient   How much help for putting on and taking off regular lower body clothing?: A Lot  How much help for Bathing?: A Lot  How much help for Toileting?: A Lot  How much help for putting on and taking off regular upper body clothing?: A Lot  How much help for taking care of personal grooming?: A Lot  How much help for eating meals?: None  AM-PAC Inpatient Daily Activity Raw Score: 14  AM-PAC Inpatient ADL T-Scale Score : 33.39  ADL Inpatient CMS 0-100% Score: 59.67  ADL Inpatient CMS G-Code Modifier : CK    Based on patient's functional performance as stated below and their level of assistance needed prior to admission, this therapist believes that the patient would benefit from skilled Occupational Therapy following their hospital stay in an effort to increase safety and independence with completion of ADL/IADL tasks for functional independence and quality of life. Diagnosis:   1. Closed nondisplaced fracture of sixth cervical vertebra, unspecified fracture morphology, initial encounter ([facility] [address]    2. Closed nondisplaced fracture of seventh cervical vertebra, unspecified fracture morphology, initial encounter ([facility] [address]    3. Closed nondisplaced fracture of shaft of right clavicle, initial encounter    4. Humeral head fracture, right, closed, initial encounter    5. Saccular aneurysm      Pertinent Medical History:   Past Medical History:   Diagnosis Date    Hyperlipidemia     Hypertension     Thyroid disease       XR Shoulder Right 21  Essentially nondisplaced fracture of the midshaft of the right clavicle.    There is suspicion of a right humeral head fracture with slight Right     Left Right Comment   Passive range of motion WFL N/T     Active range of motion [facility]  N/T     Muscle Grade 4/5 N/T     /pinch Strength Intact  N/T       Functional Assessment:   Initial Evaluation Status Date:   1/5/2020 Treatment Status  Date: STGs = LTGs  Time frame: 5 - 14 days   Feeding Independent   Independent    Grooming Moderate Assist   Independent    UB Dressing Moderate Assist   Independent    LB Dressing Moderate Assist   Independent    Bathing Moderate Assist  Modified [place]    Toileting Moderate Assist    Independent    Bed Mobility  N/T as pt was seated in bedside chair after working with physical therapy and returned to bedside chair at end of evaluation  Supine to sit: Independent   Scooting:Independent  Sit to supine: Independent   Rolling: Independent   Functional Transfers Supervision from bedside chair. Minimal assist for transfer to and from commode. Transfer training with verbal cues for hand placement throughout session to improve safety. Independent    Functional Mobility Minimal Assist with  to improve balance, verbal cues for walker sequence and safety. Supervision     Activity Tolerance Fair   Good      Balance:   Sitting balance at EOB to increase dynamic sitting balance and activities tolerance with Supervision     Standing with no device to improve balance   Endurance: fair     Comments: Upon arrival to the room the patient was in chair. Sling adjusted to proper fit. At end of the session, the patient was returned to chair. Call light and phone within reach. Pt required verbal cues and instruction as noted above for safe facilitation and completion of tasks. Therapist provided skilled monitoring of the patient's response during treatment session. Prior to and at the end of session, environmental modifications/line management completed for patients safety and efficiency of treatment session.  OT services provided to include instruction/training on safety and adapted techniques for completion of transfers and ADL's. Overall, the patient demonstrates difficulties with completion of BADL's and IADL's. Factors contributing to these difficulties includes decreased endurance and generalized weakness. Pt would benefit from continued skilled OT to increase independence with BADL's and IADL's. Treatment:    Bed mobility: Facilitated bed mobility with cues for proper body mechanics and sequencing to prepare for ADL completion. Functional transfers: Facilitated transfers from various surfaces with cues for body alignment, safety and hand placement. ADL completion: Self-care retraining for the above-mentioned ADLs; training on proper hand placement, safety technique, sequencing, and energy conservation techniques. Postural Balance: Sitting and standing balance retraining to improve righting reactions with postural changes during ADLs. Skilled positioning: Proper positioning to improve interaction with environment, overall functioning and decrease/prevent edema and contractures. Evaluation Complexity:   · Low Complexity  · History: Brief history including review of medical records relating to the problem  · Exam: 1-3 performance Deficits  · Assistance/Modification: No assistance or modifications required to perform tasks. No comorbities affecting occupational performance.     Assessment of current deficits:   Functional mobility [x]        ADLs [x]        Strength [x]      Cognition []  Functional transfers  [x]       IADLs [x]        Safety Awareness []      Endurance [x]  Fine Motor Coordination []       Balance [x]       Vision/perception []     Sensation []   Gross Motor Coordination []       ROM []         Delirium []                          Motor Control []    Plan of Care: OT 1-3x/week for 5-7 days PRN   Instruction/training on adapted ADL techniques and AE recommendations to increased functional independence with precautions   Functional transfer/mobility

## 2021-01-06 VITALS
WEIGHT: 110 LBS | RESPIRATION RATE: 16 BRPM | SYSTOLIC BLOOD PRESSURE: 158 MMHG | DIASTOLIC BLOOD PRESSURE: 81 MMHG | TEMPERATURE: 98 F | HEART RATE: 75 BPM | OXYGEN SATURATION: 96 % | HEIGHT: 65 IN | BODY MASS INDEX: 18.33 KG/M2

## 2021-01-06 LAB
ANION GAP SERPL CALCULATED.3IONS-SCNC: 15 MMOL/L (ref 7–16)
BUN BLDV-MCNC: 32 MG/DL (ref 8–23)
CALCIUM SERPL-MCNC: 9.3 MG/DL (ref 8.6–10.2)
CHLORIDE BLD-SCNC: 100 MMOL/L (ref 98–107)
CO2: 20 MMOL/L (ref 22–29)
CREAT SERPL-MCNC: 1.2 MG/DL (ref 0.5–1)
GFR AFRICAN AMERICAN: 51
GFR NON-AFRICAN AMERICAN: 42 ML/MIN/1.73
GLUCOSE BLD-MCNC: 105 MG/DL (ref 74–99)
HCT VFR BLD CALC: 34.1 % (ref 34–48)
HEMOGLOBIN: 11.6 G/DL (ref 11.5–15.5)
MCH RBC QN AUTO: 30.8 PG (ref 26–35)
MCHC RBC AUTO-ENTMCNC: 34 % (ref 32–34.5)
MCV RBC AUTO: 90.5 FL (ref 80–99.9)
PDW BLD-RTO: 12.1 FL (ref 11.5–15)
PLATELET # BLD: 254 E9/L (ref 130–450)
PMV BLD AUTO: 10 FL (ref 7–12)
POTASSIUM SERPL-SCNC: 4.2 MMOL/L (ref 3.5–5)
RBC # BLD: 3.77 E12/L (ref 3.5–5.5)
SARS-COV-2, NAAT: NOT DETECTED
SODIUM BLD-SCNC: 135 MMOL/L (ref 132–146)
WBC # BLD: 9.8 E9/L (ref 4.5–11.5)

## 2021-01-06 PROCEDURE — 97535 SELF CARE MNGMENT TRAINING: CPT

## 2021-01-06 PROCEDURE — 6360000002 HC RX W HCPCS: Performed by: INTERNAL MEDICINE

## 2021-01-06 PROCEDURE — 85027 COMPLETE CBC AUTOMATED: CPT

## 2021-01-06 PROCEDURE — 97110 THERAPEUTIC EXERCISES: CPT

## 2021-01-06 PROCEDURE — 94761 N-INVAS EAR/PLS OXIMETRY MLT: CPT

## 2021-01-06 PROCEDURE — 6370000000 HC RX 637 (ALT 250 FOR IP): Performed by: INTERNAL MEDICINE

## 2021-01-06 PROCEDURE — 6370000000 HC RX 637 (ALT 250 FOR IP): Performed by: STUDENT IN AN ORGANIZED HEALTH CARE EDUCATION/TRAINING PROGRAM

## 2021-01-06 PROCEDURE — 23500 CLTX CLAVICULAR FX W/O MNPJ: CPT | Performed by: ORTHOPAEDIC SURGERY

## 2021-01-06 PROCEDURE — 36415 COLL VENOUS BLD VENIPUNCTURE: CPT

## 2021-01-06 PROCEDURE — 99239 HOSP IP/OBS DSCHRG MGMT >30: CPT | Performed by: INTERNAL MEDICINE

## 2021-01-06 PROCEDURE — 97530 THERAPEUTIC ACTIVITIES: CPT

## 2021-01-06 PROCEDURE — U0002 COVID-19 LAB TEST NON-CDC: HCPCS

## 2021-01-06 PROCEDURE — 99232 SBSQ HOSP IP/OBS MODERATE 35: CPT | Performed by: FAMILY MEDICINE

## 2021-01-06 PROCEDURE — 80048 BASIC METABOLIC PNL TOTAL CA: CPT

## 2021-01-06 PROCEDURE — 97116 GAIT TRAINING THERAPY: CPT

## 2021-01-06 RX ORDER — LEVOTHYROXINE SODIUM 88 UG/1
88 TABLET ORAL DAILY
Qty: 30 TABLET | Refills: 3 | Status: SHIPPED | OUTPATIENT
Start: 2021-01-07

## 2021-01-06 RX ORDER — HYDROCODONE BITARTRATE AND ACETAMINOPHEN 5; 325 MG/1; MG/1
1 TABLET ORAL EVERY 4 HOURS PRN
Status: DISCONTINUED | OUTPATIENT
Start: 2021-01-06 | End: 2021-01-06 | Stop reason: HOSPADM

## 2021-01-06 RX ORDER — HYDROCODONE BITARTRATE AND ACETAMINOPHEN 5; 325 MG/1; MG/1
1 TABLET ORAL EVERY 4 HOURS PRN
Qty: 18 TABLET | Refills: 0 | Status: SHIPPED | OUTPATIENT
Start: 2021-01-06 | End: 2021-01-09

## 2021-01-06 RX ADMIN — AMLODIPINE BESYLATE 5 MG: 5 TABLET ORAL at 09:11

## 2021-01-06 RX ADMIN — ATORVASTATIN CALCIUM 10 MG: 10 TABLET, FILM COATED ORAL at 09:11

## 2021-01-06 RX ADMIN — LEVOTHYROXINE SODIUM 88 MCG: 0.09 TABLET ORAL at 06:14

## 2021-01-06 RX ADMIN — ENOXAPARIN SODIUM 30 MG: 30 INJECTION SUBCUTANEOUS at 09:11

## 2021-01-06 RX ADMIN — ACETAMINOPHEN 650 MG: 325 TABLET, FILM COATED ORAL at 04:01

## 2021-01-06 RX ADMIN — METOPROLOL SUCCINATE 25 MG: 25 TABLET, EXTENDED RELEASE ORAL at 09:11

## 2021-01-06 RX ADMIN — DOCUSATE SODIUM 100 MG: 100 CAPSULE ORAL at 09:11

## 2021-01-06 ASSESSMENT — PAIN DESCRIPTION - LOCATION: LOCATION: GENERALIZED

## 2021-01-06 ASSESSMENT — PAIN DESCRIPTION - PAIN TYPE: TYPE: ACUTE PAIN

## 2021-01-06 NOTE — PROGRESS NOTES
Patient continues to have pain, but it is well controlled.     Pe unchanged M&s intact    APclavicle fracture:  continue HEP fu  2wks

## 2021-01-06 NOTE — CARE COORDINATION
CM note: placed call to POA again, left information on discharge time/destination and the contact information for [facility] for payment of wheelchair transport.

## 2021-01-06 NOTE — PROGRESS NOTES
Palliative Care Department  [phone]  Palliative Care Progress Note  Provider [facility]  [id number]  Hospital Day: 28  Date of Initial Consult: 1/5/2021  Referring Provider: Ben Atkinson MD  Palliative Medicine was consulted for assistance with: goals of care, code status discussion    HPI:   Crystal Segundo is a 80 y.o. with a medical history of HLD, HTN who was admitted on 1/4/2021 from home with a CHIEF COMPLAINT of fall. ASSESSMENT/PLAN:     Pertinent Hospital Diagnoses      Fall at home with C6 and C7 transverse process fracture, right 1st rib fracture and right clavicle fracture      Palliative Care Encounter / Counseling Regarding Goals of Care  Please see detailed goals of care discussion as below   At this time, Crystal Segundo, Does Not have capacity for medical decision-making. Capacity is time limited and situation/question specific   During encounter Gissel Marchenna was surrogate medical decision-maker   Outcome of goals of care meeting: no intubation, no CPR, ok to go to rehab   Code status Limited no intubation no CPR   Advanced Directives: no POA or living will in epic   Surrogate/Legal NOK:  ninfa Rana Eulalio [phone]  o Carol Angelica [phone]  o Luis Manueljet [place] [phone]    Spiritual assessment: no spiritual distress identified  Bereavement and grief: to be determined  Referrals to: none today  SUBJECTIVE:     Current medical issues leading to Palliative Medicine involvement include   Active Hospital Problems    Diagnosis Date Noted    Fall [W19. XXXA]     Goals of care, counseling/discussion [Z71.89]     DNR (do not resuscitate) discussion [Z71.89]     Palliative care by specialist [Z51.5]     Closed fracture of sixth cervical vertebra without spinal cord injury ([facility] [address] [S12.500A] 01/04/2021    Weakness [R53.1] 01/04/2021       Details of Conversation:  Plan for d/c to [facility].   Patienet complaining of right clavicle pain, tearful, asking when she can

## 2021-01-06 NOTE — PROGRESS NOTES
OT BEDSIDE TREATMENT NOTE      Date:2021  Patient Name: Tamara Jimenez  MRN: [id number]  : 3/31/1929  Room: [address] [place] [address]     Referring Provider: Barrett Rasheed MD  Evaluating OT: Shanna Arias OTR/L [id number]     Placement Recommendation: Subacute    Recommended Adaptive Equipment: TBD at rehab   Comment: pt states she wrote a note on bedsheet (with lipstick) to nurse to tell her she was hungry; letters and numbers written, however not legible; provided pt with call light; sitter present     [facility]   AM-PAC Daily Activity Inpatient   How much help for putting on and taking off regular lower body clothing?: A Lot  How much help for Bathing?: A Lot  How much help for Toileting?: A Lot  How much help for putting on and taking off regular upper body clothing?: A Lot  How much help for taking care of personal grooming?: A Lot  How much help for eating meals?: None  AM-[facility] Inpatient Daily Activity Raw Score: 14  AM-PAC Inpatient ADL T-Scale Score : 33.39  ADL Inpatient CMS 0-100% Score: 59.67  ADL Inpatient CMS G-Code Modifier : CK     Based on patient's functional performance as stated below and their level of assistance needed prior to admission, this therapist believes that the patient would benefit from skilled Occupational Therapy following their hospital stay in an effort to increase safety and independence with completion of ADL/IADL tasks for functional independence and quality of life.      Diagnosis:   1. Closed nondisplaced fracture of sixth cervical vertebra, unspecified fracture morphology, initial encounter ([facility] [address]    2. Closed nondisplaced fracture of seventh cervical vertebra, unspecified fracture morphology, initial encounter ([facility] [address]    3. Closed nondisplaced fracture of shaft of right clavicle, initial encounter    4. Humeral head fracture, right, closed, initial encounter    5.  Saccular aneurysm       Pertinent Medical History:   Past Medical History        Past Medical History:   Diagnosis Date    Hyperlipidemia      Hypertension      Thyroid disease           XR Shoulder Right 1/4/21  Essentially nondisplaced fracture of the midshaft of the right clavicle. There is suspicion of a right humeral head fracture with slight subluxation.      CT Cervical Spine WO Contrast 1/4/21  1.  Acute, nondisplaced fractures of the C6 and C7 transverse processes on the right and with additional fracture of the right 1st rib and right clavicle. 2.  No evidence of unstable cervical spine fracture or suspicious central canal narrowing. 3.  Multilevel degenerative changes with loss of the normal cervical lordosis.     CT Head WO Contrast 1/4/21  Focal 1.5 cm round lesion in in the right middle cerebral artery, likely represents aneurysm.  Further evaluation may be obtained with CT angiogram of the head if clinically warranted. No calvarial fractures.  No intracranial hemorrhage.     CTA Head W Contrast 1/4/21  1.2 x 1.5 x 1.3 cm large saccular aneurysm at the superior aspect of the   supraclinoid segment of right internal carotid artery. Otherwise, no acute abnormality or flow-limiting stenosis in the remainder of   the major arteries of the head.     CT Facial Bones WO Contrast 1/5/21  Complete opacification of the right maxillary sinus likely reflecting chronic sinusitis no acute traumatic injury of the facial bones. Calcified focus at the posterior epidural space C5-C6 level.  Could reflect a heavily calcified meningioma or dystrophic calcification.     Precautions:  falls, NWB: R UE with sling, 1st R rib fx, confusion (pt states she lives with her mother)  Pain Scale: Numeric Rate: no pain. Social history: alone                             Drive: yes                          Occupation: retired    Home architecture: single family home, 1 story, 3 steps to enter with rail, tub shower.    PLOF: independent with BADL and independent with IADL, pt ambulated with cane    Equipment owned: wheeled walker, cane, bedside commode, tub transfer bench, grab bars   Cognition: A&O x 4; follows 3 step directions. fair  Problem solving skills              fair  Memory               fair  Sequencing              fair  Judgement/safety  Communication: intact   Visual perceptual skills: intact                Glasses: no   Edema: no     Sensation: intact   Hand Dominance:  Left     X Right       Left Right Comment   Passive range of motion WFL N/T      Active range of motion WFL  N/T      Muscle Grade 4/5 N/T      /pinch Strength Intact  N/T         Functional Assessment:    Initial Evaluation Status Date:   1/5/2020 Treatment Status  Date:1/6/2021 STGs = LTGs  Time frame: 5 - 14 days   Feeding Independent  N/T Independent    Grooming Moderate Assist  Min A  When standing sinkside; pt able to manage containers and complete oral hygiene when seated EOB: assist for standing balance and cuing to use LUE 2* to R UE NWB; pt able to comb hair when seated in chair with L UE. Assist to braid hair; pt tolerated standing sinkside to wash and dry face; cuing to use L UE for grooming tasks 2* to NWB in R UE Independent    UB Dressing Moderate Assist   Mod A to adjust R UE sling Independent    LB Dressing Moderate Assist  Max A to change socks d/t decreased endurance Independent    Bathing Moderate Assist  N/T - pt had assist for bathing prior to session Modified [place]    Toileting Moderate Assist    N/T Independent    Bed Mobility  N/T as pt was seated in bedside chair after working with physical therapy and returned to bedside chair at end of evaluation  Min/Mod A for supine to sit with assist to guide UB to sitting; scooting at min A ; sit to supine at Mod A with assist to guide UB and LE's to supine; assist for positioning of R UE on RTB Supine to sit: Independent   Scooting:Independent  Sit to supine: Independent   Rolling: Independent   Functional Transfers Supervision from bedside chair.    Minimal assist for transfer to and from commode. Transfer training with verbal cues for hand placement throughout session to improve safety.   Sit to stand from EOB at min A ; transfer to/from chair at min/mod A with HHA; transfer to EOB at min A with HHA Independent    Functional Mobility Minimal Assist with  to improve balance, verbal cues for walker sequence and safety.   Min A with HHA to/from bathroom sink ; unsteadiness noted Supervision     Activity Tolerance Fair   fair Good           Comments: Patient cleared by nursing staff. Upon arrival pt supine in bed. Pt agreeable to OT tx session. Sitter present, however stepped OOR during session. Pt educated with regards to bed mobility, hand placement, NWB in R UE, adjustment of R UE sling, UE dressing (gown), safety awareness, static sitting balance,  standing balance, functional mobility, grooming tasks,  LE/UE dressing, ECT's. At end of session pt supine in bed  with all lines and tubes intact, call light within reach. Overall, pt demonstrated decreased independence and safety during completion of ADL/functional transfers/mobility tasks. Pt would benefit from continued skilled OT to increase safety and independence with completion of ADL/IADL tasks for functional independence and quality of life. Pt required cues and education as noted above for safe facilitation and completion of tasks. Therapist provided skilled monitoring of patient's response during treatment session. Prior to and at the end of session, environmental modifications  completed for patients safety and efficiency of treatment session. Overall, patient demonstrates moderate difficulties with completion of BADLs and IADLs. Factors contributing to these difficulties include NWB in R UE, confusion, decreased balance, decreased endurance, and generalized weakness. As noted above, patient likely to benefit from further OT intervention to increase independence, safety, and overall quality of life. Treatment:     ? Bed mobility: Facilitated bed mobility with cues for proper body mechanics and sequencing to prepare for ADL completion. ? Functional transfers: Facilitated transfers from various surfaces with cues for body alignment, safety and hand placement. ? ADL completion: Self-care retraining for the above-mentioned ADLs; training on proper hand placement, safety technique, sequencing, and energy conservation techniques. ? Postural Balance: Sitting/standing balance retraining to improve righting reactions with postural changes during ADLs. ? Skilled positioning: Proper positioning to improve interaction with environment, overall functioning and decrease/prevent edema and contractures.     · Pt has made fair progress towards set goals   ·   OT 1-3x/week for 5-7 days during hospitalization       Treatment Time In: 10:22 AM     Treatment Time Out: 10:48 AM            Treatment Charges: Mins Units   ADL/Home Mgt     [id number] 18 1   Thera Activities     31792 8 1   Ther Ex                 06832       Manual Therapy    42845     Neuro Re-ed         51333     Orthotic manage/training                               32368     Non Billable Time     Total Timed Treatment 26 9[address] [facility], C[facility]STINA/L #[id number]

## 2021-01-06 NOTE — PROGRESS NOTES
back to sitting on side of bed?: A Little  How much help from another person moving to and from a bed to a chair?: A Little  How much help from another person needed to walk in hospital room?: A Little  How much help from another person for climbing 3-5 steps with a railing?: A Lot  AM-PAC Inpatient Mobility Raw Score : 17  AM-PAC Inpatient T-Scale Score : 42.13  Mobility Inpatient CMS 0-100% Score: 50.57  Mobility Inpatient CMS G-Code Modifier : CK    Nursing cleared patient for PT treatment. .      OBJECTIVE;   Initial Evaluation  Date: 1/5/21 Treatment Date:    1/6/2021   Short Term/ Long Term   Goals   Was pt agreeable to Eval/treatment? Yes   yes To be met in 3 days   Pain level   3/10  right elbow No rated    Bed Mobility    Rolling: Minimal assist of 1    Supine to sit: Minimal assist of 1    Sit to supine: Minimal assist of 1    Scooting: Minimal assist of 1   Rolling: Minimal assist of 1    Supine to sit: Minimal assist of 1    Sit to supine: Not assessed     Scooting: Minimal assist of 1     Rolling: Independent    Supine to sit:  Independent    Sit to supine: Independent    Scooting: Independent     Transfers Sit to stand: Minimal assist of 1 d/t unsteadiness Sit to stand: Minimal assist of 1 with cues for technique non weight bearing right upper extremity     Sit to stand: Independent     Ambulation    4x75 feet using  hand held assist with Moderate assist of 1   for loss of balance and poor attention to environment 2 x 80 feet using  hand held assist with Minimal assist of 1 with cues for safety, upright posture     100 feet using  cane with Supervision     Stair negotiation: ascended and descended   5 steps with rail on left mod a for balance and cues for rail use and sequencing; using reciprocating pattern despite cuing which increases risk for fall    Not assessed       5 steps with    ROM Within functional limits with exception of Right upper extremitiy in sling      Strength LUE:  3/5  RLE: 3/5  LLE:  3/5   Increase strength in affected mm groups by 1/3 grade   Balance Sitting EOB:  fair    Dynamic Standing:  poor   Sitting EOB: fair    Dynamic Standing: fair -     Sitting EOB:  good    Dynamic Standing: fair with cane     Patient is Alert & Oriented x person and follows one step directions  inconsistently  Sensation:  Patient  denies numbness and tingling      Edema:  no    Endurance: fair          Patient education  Patient educated on role of Physical Therapy, risks of immobility, safety and plan of care, energy conservation and  importance of mobility while in hospital     Patient was educated and facilitated on techniques to increase safety and independence with bed mobility, balance, functional transfers, and functional mobility. Patient was explained the the benefits of mobility and risks of immobility. Patient response to education:   Pt verbalized understanding Pt demonstrated skill Pt requires further education in this area   No No Yes      Treatment:  Patient practiced and was instructed/facilitated in the following treatment: Patient   Sat edge of bed 10 minutes with Supervision  to increase dynamic sitting balance and activity tolerance. seated and standing challenges, amb in hallway and returned to sit in chair, pt amb to restroom, independent for hygiene. warm blankets provided with pt in chair at end of treatment   Therapeutic Exercises:  ankle pumps, long arc quad and seated marching x 20 reps       At end of session, patient in chair with supervision call light and phone within reach,   all lines and tubes intact, nursing notified. Patient would benefit from continued skilled Physical Therapy to improve functional independence and quality of life. Patient's/ family goals   home        ASSESSMENT: Patient exhibits decreased strength, balance, coordination impairing functional mobility.  Decreased strength and balance are barriers to d/c and require skilled intervention during hospital stay    Pt unable to be alone d/t balance deficit and increased risk for fall  . Pt fatigued with rests provided. Plan of Care:     -Standing Balance: Perform strengthening exercises in standing to promote motor control with or without upper extremity support  and Instruct patient on adequate base of support to maintain balance  -Transfers: Provide instruction on proper hand and foot position for adequate transfer of weight onto lower extremities and use of gait device, Cues for hand placement, technique and safety and Provide stabilization to prevent fall   -Gait: Gait training, Standing activities to improve: base of support, weight shift, weight bearing  and Exercises to improve hip and knee control   -Endurance: Utilize Supervised activities to increase level of endurance to allow for safe functional mobility including transfers and gait   -Stairs: Stair training with instruction on proper technique and hand placement on rail    Patient and or family understand(s) diagnosis, prognosis, and plan of care.     Frequency of treatments: Patient will be seen 3-5      Time in  130  Time out  159    Total Treatment Time  29 minutes        CPT codes:    Therapeutic exercises (04108)   14 minutes  1 unit(s)  Gait Training (25554) 15 minutes 1 unit(s)    Andrea Parker, PTA  [id number]

## 2021-01-06 NOTE — CARE COORDINATION
CM note: discharge order noted. Transport arrangements made with [employer] via wheelchair at [address] will need to call in a pay for trip prior to arrival time. Attempted to call POA, message left and awaiting a return call. Liaison for [facility] aware of arrangements.

## 2021-01-06 NOTE — DISCHARGE SUMMARY
[facility] Physician Discharge Summary       No follow-up provider specified. Activity level: Slowly increase as tolerated    Diet: DIET GENERAL;    Labs: None are pending at the discharge    Condition at discharge: Stable    Dispo: Return to home setting    Continue supplemental oxygen via nasal canula @ 2 LPM round-the-clock. Continue CPAP / BiPAP during sleep as prior to admission. Patient ID:  Edin Lassiter  [id number]  68 y.o.  3/31/1929    Admit date: 1/4/2021    Discharge date and time:  1/6/2021  9:05 AM    Admission Diagnoses: Principal Problem:    Closed fracture of sixth cervical vertebra without spinal cord injury [facility])  Active Problems:    Weakness    Fall    Goals of care, counseling/discussion    DNR (do not resuscitate) discussion    Palliative care by specialist  Resolved Problems:    * No resolved hospital problems. *      Discharge Diagnoses: Principal Problem:    Closed fracture of sixth cervical vertebra without spinal cord injury [facility])  Active Problems:    Weakness    Fall    Goals of care, counseling/discussion    DNR (do not resuscitate) discussion    Palliative care by specialist  Resolved Problems:    * No resolved hospital problems. *      Consults:  IP CONSULT TO TRAUMA SURGERY  IP CONSULT TO NEUROSURGERY  IP CONSULT TO ORTHOPEDIC SURGERY  IP CONSULT TO PALLIATIVE CARE    Procedures: None significant except if described in hospital course. Hospital Course:     Presented from a fall at home EMS that she was on the ground for 30 minutes but her extensive bruising leads me to believe that she was falling more often before picked up by EMS. Furthermore staff reports that POA notified about her behaviors and confusions and they add that she has not been taking her medication properly despite having 5 hours of home care day. I agree that she ought to be placed.      Regarding her work-up for her metabolic encephalopathy her B12 and folate BP:  (!) 144/67 (!) 146/75 (!) 158/81   Pulse: 72 88 97 93   Resp:  16 16 16   Temp:  98 °F (36.7 °C) 97.6 °F (36.4 °C) 98 °F (36.7 °C)   TempSrc:  Oral  Oral   SpO2:  98% 98%    Weight:       Height:           No acute distress moist General Appearance: alert and oriented to person, place and time and in no acute distress  Skin: warm and dry  Head: normocephalic and atraumatic  Eyes: pupils equal, round, and reactive to light, extraocular eye movements intact, conjunctivae normal  Neck: neck supple and non tender without mass   Pulmonary/Chest: clear to auscultation bilaterally- no wheezes, rales or rhonchi, normal air movement, no respiratory distress  Cardiovascular: normal rate, normal S1 and S2 and no carotid bruits  Abdomen: soft, non-tender, non-distended, normal bowel sounds, no masses or organomegaly  Extremities: no cyanosis, no clubbing and no edema  Neurologic: no cranial nerve deficit and speech normal.  Memory impaired. Somewhat anxious but redirectable by me and the sitter  Right arm in sling but she forgets and moves it which then elicits mild pain. Ecchymosis throughout face chin and upper body  No intake/output data recorded. I/O this shift:  In: 180 [P.O.:180]  Out: -       LABS:  Recent Labs     01/04/21 0756 01/05/21 0414 01/06/21  0458    137 135   K 3.8 3.9 4.2    101 100   CO2 19* 23 20*   BUN 20 27* 32*   CREATININE 1.1* 1.1* 1.2*   GLUCOSE 130* 124* 105*   CALCIUM 9.9 9.1 9.3       Recent Labs     01/04/21  0756 01/05/21  0414 01/06/21  0458   WBC 13.2* 9.5 9.8   RBC 4.34 3.75 3.77   HGB 13.4 11.5 11.6   HCT 40.1 33.8* 34.1   MCV 92.4 90.1 90.5   MCH 30.9 30.7 30.8   MCHC 33.4 34.0 34.0   RDW 11.9 12.0 12.1    258 254   MPV 9.6 9.8 10.0       No results for input(s): POCGLU in the last 72 hours.         Imaging:  Xr Shoulder Right (min 2 Views)    Result Date: 1/4/2021  EXAMINATION: ONE XRAY VIEW OF THE CHEST; THREE XRAY VIEWS OF THE RIGHT SHOULDER; THREE XRAY VIEWS OF THE LEFT SHOULDER 1/4/2021 10:42 am COMPARISON: None. HISTORY: ORDERING SYSTEM PROVIDED HISTORY: fever TECHNOLOGIST PROVIDED HISTORY: Reason for exam:->fever FINDINGS: Left shoulder is normal.  There is a fracture of the midshaft of the right clavicle which is essentially nondisplaced. There is a suggestion of a nondisplaced fracture of the right humeral head. The humeral head is somewhat subluxed. Heart size is normal.  There is tortuosity of the aorta. There are no infiltrates or effusions. There is no pneumothorax. There are no obvious rib fractures. Essentially nondisplaced fracture of the midshaft of the right clavicle. There is suspicion of a right humeral head fracture with slight subluxation. Recommend obtaining right axillary view if possible. Xr Hip Bilateral W Ap Pelvis (2 Views)    Result Date: 1/4/2021  EXAMINATION: ONE XRAY VIEW OF THE PELVIS AND TWO XRAY VIEWS OF EACH OF THE BILATERAL HIPS 1/4/2021 9:43 am COMPARISON: None. HISTORY: ORDERING SYSTEM PROVIDED HISTORY: fall TECHNOLOGIST PROVIDED HISTORY: Reason for exam:->fall FINDINGS: Motion artifact is somewhat limiting. No definite acute fracture or dislocation. Normal soft tissues. The bones are demineralized. 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 No definite acute fracture or dislocation. 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 Osteopenia. Ct Head Wo Contrast    Result Date: 1/4/2021  EXAMINATION: CT OF THE HEAD WITHOUT CONTRAST  1/4/2021 8:41 am TECHNIQUE: CT of the head was performed without the administration of intravenous contrast. Dose modulation, iterative reconstruction, and/or weight based adjustment of the mA/kV was utilized to reduce the radiation dose to as low as reasonably achievable. COMPARISON: None. HISTORY: ORDERING SYSTEM PROVIDED HISTORY: fall TECHNOLOGIST PROVIDED HISTORY: Reason for exam:->fall Has a \"code stroke\" or \"stroke alert\" been called? ->No FINDINGS: BRAIN/VENTRICLES: There is no acute intracranial hematoma or mass is seen. The orbital walls and rims are intact. SINUSES/MASTOIDS:  There is complete opacification of the and right maxillary sinus likely reflecting chronic sinusitis and SOFT TISSUES:  No appreciable facial soft tissue swelling is seen. Identified is a densely calcified focus 0.8 cm posterior epidural space C5-C6 level. Degenerative change of the cervical spine noted    Complete opacification of the right maxillary sinus likely reflecting chronic sinusitis no acute traumatic injury of the facial bones. Calcified focus at the posterior epidural space C5-C6 level. Could reflect a heavily calcified meningioma or dystrophic calcification. Ct Cervical Spine Wo Contrast    Addendum Date: 1/4/2021    ADDENDUM: Abnormal CT findings were conveyed by Dr. Riana Soliz to Dr. Miky Peña via telephone at [phone] hours. Result Date: 1/4/2021  EXAMINATION: CT OF THE CERVICAL SPINE WITHOUT CONTRAST 1/4/2021 8:41 am TECHNIQUE: CT of the cervical spine was performed without the administration of intravenous contrast. Multiplanar reformatted images are provided for review. Dose modulation, iterative reconstruction, and/or weight based adjustment of the mA/kV was utilized to reduce the radiation dose to as low as reasonably achievable. COMPARISON: None. HISTORY: ORDERING SYSTEM PROVIDED HISTORY: fall TECHNOLOGIST PROVIDED HISTORY: Reason for exam:->fall FINDINGS: The cervical vertebra are normal in height. Acute, nondisplaced fractures of the C6 and C7 transverse processes on the right together with adjacent nondisplaced fracture of the posterior right 1st rib. Additional displaced fracture of the right clavicle. Associated soft tissue swelling at the right neck compatible with hemorrhage and bruising. Intact dens and cranial cervical junction. Facet joint arthritis with grade 1 anterolisthesis of C3 on C4 believed degenerative in origin. Loss of the normal cervical lordosis. Multilevel degenerative changes. ORDERING SYSTEM PROVIDED HISTORY: fever TECHNOLOGIST PROVIDED HISTORY: Reason for exam:->fever FINDINGS: Left shoulder is normal.  There is a fracture of the midshaft of the right clavicle which is essentially nondisplaced. There is a suggestion of a nondisplaced fracture of the right humeral head. The humeral head is somewhat subluxed. Heart size is normal.  There is tortuosity of the aorta. There are no infiltrates or effusions. There is no pneumothorax. There are no obvious rib fractures. Essentially nondisplaced fracture of the midshaft of the right clavicle. There is suspicion of a right humeral head fracture with slight subluxation. Recommend obtaining right axillary view if possible. Cta Neck W Contrast    Result Date: 1/5/2021  EXAMINATION: CTA OF THE NECK 1/5/2021 10:41 am TECHNIQUE: CTA of the neck was performed with the administration of intravenous contrast. Multiplanar reformatted images are provided for review. MIP images are provided for review. Stenosis of the internal carotid arteries measured using NASCET criteria. Dose modulation, iterative reconstruction, and/or weight based adjustment of the mA/kV was utilized to reduce the radiation dose to as low as reasonably achievable. COMPARISON: CT cervical spine performed 01/04/2021. HISTORY: ORDERING SYSTEM PROVIDED HISTORY: found down, multiple cervical spine fractures TECHNOLOGIST PROVIDED HISTORY: Reason for exam:->found down, multiple cervical spine fractures Has a \"code stroke\" or \"stroke alert\" been called? ->No FINDINGS: AORTIC ARCH/ARCH VESSELS: No dissection or arterial injury. No significant stenosis of the brachiocephalic or subclavian arteries. CAROTID ARTERIES: No dissection, arterial injury, or hemodynamically significant stenosis by NASCET criteria. VERTEBRAL ARTERIES: No dissection, arterial injury, or significant stenosis. SOFT TISSUES: The lung apices are clear. No cervical or superior mediastinal lymphadenopathy.   The larynx and pharynx are unremarkable. No acute abnormality of the salivary and thyroid glands. BONES: There is redemonstration of right-sided transverse process fractures at C6 and C7. Unremarkable CTA of the neck without stenosis or occlusion. No traumatic injury of the vessels is appreciated. Stable previously described cervical spine fractures. Cta Head W Contrast    Result Date: 1/4/2021  EXAMINATION: CTA OF THE HEAD WITH CONTRAST 1/4/2021 9:53 am: TECHNIQUE: CTA of the head/brain was performed with the administration of intravenous contrast. Multiplanar reformatted images are provided for review. MIP images are provided for review. Dose modulation, iterative reconstruction, and/or weight based adjustment of the mA/kV was utilized to reduce the radiation dose to as low as reasonably achievable. COMPARISON: Noncontrast CT head from earlier today HISTORY: ORDERING SYSTEM PROVIDED HISTORY: right mca aneurysm on ct head TECHNOLOGIST PROVIDED HISTORY: Reason for exam:->right mca aneurysm on ct head Has a \"code stroke\" or \"stroke alert\" been called? ->No FINDINGS: ANTERIOR CIRCULATION: The A1 segment of the right TREASURE is hypoplastic. No significant stenosis of the intracranial internal carotid, anterior cerebral, or middle cerebral arteries. There is a 1.2 x 1.5 x 1.3 cm (transverse by AP by craniocaudal) saccular aneurysm at the superior aspect of the supraclinoid segment of right internal carotid artery. POSTERIOR CIRCULATION: There is fetal origin of the right PCA, normal variant. No significant stenosis of the vertebral, basilar, or posterior cerebral arteries. No aneurysm. OTHER: No dural venous sinus thrombosis on this non-dedicated study. BRAIN: No mass effect or midline shift. No extra-axial fluid collection. The gray-white differentiation is maintained. There is opacification of the right maxillary sinus, likely related to sinusitis.     1.2 x 1.5 x 1.3 cm large saccular aneurysm at the superior aspect of the supraclinoid segment of right internal carotid artery. Otherwise, no acute abnormality or flow-limiting stenosis in the remainder of the major arteries of the head. Patient Instructions:   Current Discharge Medication List      CONTINUE these medications which have CHANGED    Details   levothyroxine (SYNTHROID) 88 MCG tablet Take 1 tablet by mouth Daily  Qty: 30 tablet, Refills: 3         CONTINUE these medications which have NOT CHANGED    Details   metoprolol succinate (TOPROL XL) 25 MG extended release tablet Take 25 mg by mouth daily      simvastatin (ZOCOR) 40 MG tablet Take 40 mg by mouth nightly         STOP taking these medications       amLODIPine (NORVASC) 5 MG tablet Comments:   Reason for Stopping:         losartan-hydroCHLOROthiazide (HYZAAR) 100-25 MG per tablet Comments:   Reason for Stopping:                 Note that > than 30 minutes was spent in preparing discharge papers, discussing discharge with patient, medication review, etc.    NOTE: This report was transcribed using voice recognition software. Every effort was made to ensure accuracy; however, inadvertent computerized transcription errors may be present.      Signed:  Electronically signed by Smitha Grover MD on 1/6/2021 at 9:05 AM

## 2021-01-06 NOTE — PROGRESS NOTES
[address] [place], [place] [address]ist   Progress Note    Admitting Date and Time: 1/4/2021  7:20 AM  Admit Dx: Closed fracture of sixth cervical vertebra without spinal cord injury, initial encounter ([facility] [address] [S12.500A]  Weakness [R53.1]    Subjective:    Patient displaying paranoid ideas today stating that people are coming in to kill her. I redirected her daughter will introduce myself which she seems to recognize. Then she confided in me that she does believe that she will be killed and sent to have been before 6 weeks. I cussed this with the sitter and the charge nurse and the floor nurse  Per RN: No issues    ROS: denies fever, chills, cp, sob, n/v, HA unless stated above.      docusate sodium  100 mg Oral BID    levothyroxine  88 mcg Oral Daily    sodium chloride flush  10 mL Intravenous 2 times per day    enoxaparin  30 mg Subcutaneous Daily    amLODIPine  5 mg Oral Daily    atorvastatin  10 mg Oral Daily    metoprolol succinate  25 mg Oral Daily         sennosides-docusate sodium, 2 tablet, Daily PRN      sodium chloride flush, 10 mL, PRN      promethazine, 12.5 mg, Q6H PRN    Or      ondansetron, 4 mg, Q6H PRN      polyethylene glycol, 17 g, Daily PRN      acetaminophen, 650 mg, Q6H PRN    Or      acetaminophen, 650 mg, Q6H PRN         Objective:    BP (!) 158/81   Pulse 93   Temp 98 °F (36.7 °C) (Oral)   Resp 16   Ht 5' 5\" (1.651 m)   Wt 110 lb (49.9 kg)   SpO2 98%   BMI 18.30 kg/m²   General Appearance: alert and oriented to person, place and time and in no acute distress  Skin: warm and dry  Head: normocephalic and atraumatic  Eyes: pupils equal, round, and reactive to light, extraocular eye movements intact, conjunctivae normal  Neck: neck supple and non tender without mass   Pulmonary/Chest: clear to auscultation bilaterally- no wheezes, rales or rhonchi, normal air movement, no respiratory distress  Cardiovascular: normal rate, normal S1 and S2 and no carotid bruits  Abdomen: soft, non-tender, non-distended, normal bowel sounds, no masses or organomegaly  Extremities: no cyanosis, no clubbing and no edema  Neurologic: no cranial nerve deficit and speech normal.  Memory impaired. Somewhat anxious but redirectable by me and the sitter  Right arm in sling but she forgets and moves it which then elicits mild pain. Ecchymosis throughout face chin and upper body      Recent Labs     01/04/21 0756 01/05/21 0414 01/06/21 0458    137 135   K 3.8 3.9 4.2    101 100   CO2 19* 23 20*   BUN 20 27* 32*   CREATININE 1.1* 1.1* 1.2*   GLUCOSE 130* 124* 105*   CALCIUM 9.9 9.1 9.3       Recent Labs     01/04/21 0756   ALKPHOS 97   PROT 7.0   LABALBU 3.9   BILITOT 0.6   AST 27   ALT 15       Recent Labs     01/04/21 0756 01/05/21 0414 01/06/21 0458   WBC 13.2* 9.5 9.8   RBC 4.34 3.75 3.77   HGB 13.4 11.5 11.6   HCT 40.1 33.8* 34.1   MCV 92.4 90.1 90.5   MCH 30.9 30.7 30.8   MCHC 33.4 34.0 34.0   RDW 11.9 12.0 12.1    258 254   MPV 9.6 9.8 10.0       CBC:   Lab Results   Component Value Date    WBC 9.8 01/06/2021    RBC 3.77 01/06/2021    HGB 11.6 01/06/2021    HCT 34.1 01/06/2021    MCV 90.5 01/06/2021    MCH 30.8 01/06/2021    MCHC 34.0 01/06/2021    RDW 12.1 01/06/2021     01/06/2021    MPV 10.0 01/06/2021     CMP:    Lab Results   Component Value Date     01/06/2021    K 4.2 01/06/2021    K 3.8 01/04/2021     01/06/2021    CO2 20 01/06/2021    BUN 32 01/06/2021    CREATININE 1.2 01/06/2021    GFRAA 51 01/06/2021    LABGLOM 42 01/06/2021    GLUCOSE 105 01/06/2021    PROT 7.0 01/04/2021    LABALBU 3.9 01/04/2021    CALCIUM 9.3 01/06/2021    BILITOT 0.6 01/04/2021    ALKPHOS 97 01/04/2021    AST 27 01/04/2021    ALT 15 01/04/2021        Radiology:   CTA NECK W CONTRAST   Final Result   Unremarkable CTA of the neck without stenosis or occlusion. No traumatic   injury of the vessels is appreciated. Stable previously described cervical spine fractures.       CT FACIAL BONES WO CONTRAST   Final Result   Complete opacification of the right maxillary sinus likely reflecting chronic   sinusitis   no acute traumatic injury of the facial bones. Calcified focus at the posterior epidural space C5-C6 level. Could reflect a   heavily calcified meningioma or dystrophic calcification. XR SHOULDER LEFT (MIN 2 VIEWS)   Final Result   Essentially nondisplaced fracture of the midshaft of the right clavicle. There is suspicion of a right humeral head fracture with slight subluxation. Recommend obtaining right axillary view if possible. XR HIP BILATERAL W AP PELVIS (2 VIEWS)   Final Result   No definite acute fracture or dislocation. 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 Osteopenia. XR SHOULDER RIGHT (MIN 2 VIEWS)   Final Result   Essentially nondisplaced fracture of the midshaft of the right clavicle. There is suspicion of a right humeral head fracture with slight subluxation. Recommend obtaining right axillary view if possible. XR CHEST 1 VIEW   Final Result   Essentially nondisplaced fracture of the midshaft of the right clavicle. There is suspicion of a right humeral head fracture with slight subluxation. Recommend obtaining right axillary view if possible. CTA HEAD W CONTRAST   Final Result   1.2 x 1.5 x 1.3 cm large saccular aneurysm at the superior aspect of the   supraclinoid segment of right internal carotid artery. Otherwise, no acute abnormality or flow-limiting stenosis in the remainder of   the major arteries of the head. CT Head WO Contrast   Final Result   Focal 1.5 cm round lesion in in the right middle cerebral artery, likely   represents aneurysm. Further evaluation may be obtained with CT angiogram of   the head if clinically warranted. No calvarial fractures. No intracranial hemorrhage.       CT Cervical Spine WO Contrast   Final Result   Addendum 1 of 1   ADDENDUM:   Abnormal CT findings were conveyed by Dr. Lorna Sidhu to  Clarisse End via   telephone at [phone] hours. Final          Assessment:  Active Problems:    Closed fracture of sixth cervical vertebra without spinal cord injury ([facility] [address]    Weakness    Fall    Goals of care, counseling/discussion    DNR (do not resuscitate) discussion    Palliative care by specialist  Resolved Problems:    * No resolved hospital problems. *        Plan:  1. Falls and confusion at home possibly consistent with weakness and dementia. Family has reported through staff that she has not been properly compliant with medications for over a month. She will need case management to help place her versus home with family and home health care. PT OT.  B12 and folate within normal limits  2. C6 and C7 transverse processes on the right and with additional fracture of the right 1st rib and right clavicle. radiology: nondisplaced fracture of the midshaft of the right clavicle. There is suspicion of a right humeral head fracture with slight subluxation. Recommend obtaining right axillary view if possible. CT face: No evidence of facial bone trauma but there is a calcified focus of the posterior epidural space at C5-C6 which should be followed up as an outpatient. CTA neck unremarkable  Once cleared by physical therapy and orthopedics they will be okay with discharge    3. Questionable EKG abnormalities with no previous comparison could have outpatient cardiac follow-up depending on her and her family's wishes. Her advanced age must be taken into account  4. Code Status: I am hoping case management could determine if there is a POA who knows if she has other advanced directives   5. Hypothyroidism: Home dose of Synthroid is 100 MCG's per day prescribed but as mentioned above do not think she is compliant.  hard to know if she is over or under taking. TSH is suppressed below detection levels therefore I will decrease Synthroid to 88.   She will need very close follow-up and have this rechecked in 6 weeks.     6. 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 Dispo to rehab likely on 1/6 or 1/7       NOTE: This report was transcribed using voice recognition software. Every effort was made to ensure accuracy; however, inadvertent computerized transcription errors may be present.      Electronically signed by Rian Houston MD on 1/6/2021 at 8:36 AM

## 2021-01-06 NOTE — DISCHARGE INSTR - COC
Continuity of Care Form    Patient Name: Leena Verdin   :  3/31/1929  MRN:  [id number]    Admit date:  2021  Discharge date:  2021    Code Status Order: Limited   Advance Directives:      Admitting Physician:  Mayito Orourke MD  PCP: Beryl Ortega MD    Discharging Nurse: [facility] Unit/Room#: 9176/3154-02  Discharging Unit Phone Number: [phone]    Emergency Contact:   Extended Emergency Contact Information  Primary Emergency Contact: Naty Daly  Home Phone: [phone]  Relation: Other   needed? No  Secondary Emergency Contact: Kevan Graham  Phone: [phone]  Relation: Other   needed? No    Past Surgical History:  Past Surgical History:   Procedure Laterality Date    HYSTERECTOMY         Immunization History: There is no immunization history on file for this patient. Active Problems:  Patient Active Problem List   Diagnosis Code    Closed fracture of sixth cervical vertebra without spinal cord injury ([facility] [address] S12.500A    Weakness R53.1    Fall W19. Caryn Morale Goals of care, counseling/discussion Z71.89    DNR (do not resuscitate) discussion Z70.80    Palliative care by specialist Z51.5       Isolation/Infection:   Isolation            No Isolation          Patient Infection Status       None to display            Nurse Assessment:  Last Vital Signs: BP (!) 158/81   Pulse 93   Temp 98 °F (36.7 °C) (Oral)   Resp 16   Ht 5' 5\" (1.651 m)   Wt 110 lb (49.9 kg)   SpO2 98%   BMI 18.30 kg/m²     Last documented pain score (0-10 scale): Pain Level: 3  Last Weight:   Wt Readings from Last 1 Encounters:   21 110 lb (49.9 kg)     Mental Status:  disoriented and alert    IV Access:  - None    Nursing Mobility/ADLs:  Walking   Assisted  Transfer  Assisted  Bathing  Assisted  Dressing  Assisted  Toileting  Assisted  Feeding  Independent  Med Admin  Assisted  Med Delivery   whole    Wound Care Documentation and Therapy: Elimination:  Continence:   · Bowel: Yes  · Bladder: Yes  Urinary Catheter: Removal Date 1/6/2021 and Due to void   Colostomy/Ileostomy/Ileal Conduit: No       Date of Last BM: ***    Intake/Output Summary (Last 24 hours) at 1/6/2021 0857  Last data filed at 1/6/2021 0842  Gross per 24 hour   Intake 180 ml   Output --   Net 180 ml     No intake/output data recorded. Safety Concerns: At Risk for Falls    Impairments/Disabilities:      [address] [place] [address] Impairments/Disabilities:[id number]:::0}    Nutrition Therapy:  Current Nutrition Therapy:   - Oral Diet:  General    Routes of Feeding: Oral  Liquids: Thin Liquids  Daily Fluid Restriction: no  Last Modified Barium Swallow with Video (Video Swallowing Test): not done    Treatments at the Time of Hospital Discharge:   Respiratory Treatments: ***  Oxygen Therapy:  is not on home oxygen therapy.   Ventilator:    - No ventilator support    Rehab Therapies: Physical Therapy and Occupational Therapy  Weight Bearing Status/Restrictions: No weight bearing restirctions  Other Medical Equipment (for information only, NOT a DME order):  {EQUIPMENT:[id number]}  Other Treatments: ***    Patient's personal belongings (please select all that are sent with patient):  {[facility] DME Belongings:[id number]:::0}    RN SIGNATURE:  Electronically signed by Alvarez Hall RN on 1/6/21 at 2:42 PM EST    CASE MANAGEMENT/SOCIAL WORK SECTION    Inpatient Status Date: ***    Readmission Risk Assessment Score:  Readmission Risk              Risk of Unplanned Readmission:        11           Discharging to Facility/ Agency   · Name: [facility]  Phone: [phone]         Fax: [phone]        ·     Dialysis Facility (if applicable)   · Name:  · Address:  · Dialysis Schedule:  · Phone:  · Fax:    / signature: Electronically signed by Laura De Jesus RN on 1/6/21 at 10:03 AM EST    PHYSICIAN SECTION  PPrognosis: Good    Condition at Discharge: Stable Rehab Potential (if transferring to Rehab): Good    Recommended Labs or Other Treatments After Discharge: TSH on 8/25    Physician Certification: I certify the above information and transfer of Samuel Joshi  is necessary for the continuing treatment of the diagnosis listed and that she requires [facility] for less 30 days.      Update Admission H&P: No change in H&P    PHYSICIAN SIGNATURE:  Electronically signed by Ziyad Patel MD on 1/6/21 at 2:43 PM EDT

## 2021-01-15 ENCOUNTER — TELEPHONE (OUTPATIENT)
Dept: ADMINISTRATIVE | Age: 86
End: 2021-01-15

## 2021-01-28 ENCOUNTER — TELEPHONE (OUTPATIENT)
Dept: ADMINISTRATIVE | Age: 86
End: 2021-01-28

## 2021-01-28 NOTE — TELEPHONE ENCOUNTER
Kylie Ramirez of the [facility] called in to schedule patient for right clavicle fracture. Patient is schedule next avail.  Please call mtz of the [facility] if patient needs seen sooner at [facility] [address]

## 2021-02-05 ENCOUNTER — APPOINTMENT (OUTPATIENT)
Dept: GENERAL RADIOLOGY | Age: 86
DRG: 522 | End: 2021-02-05
Payer: MEDICARE

## 2021-02-05 ENCOUNTER — HOSPITAL ENCOUNTER (INPATIENT)
Age: 86
LOS: 4 days | Discharge: NURSING, REHAB OR LONG-TERM CARE | DRG: 522 | End: 2021-02-09
Attending: EMERGENCY MEDICINE | Admitting: INTERNAL MEDICINE
Payer: MEDICARE

## 2021-02-05 DIAGNOSIS — S72.002A CLOSED FRACTURE OF NECK OF LEFT FEMUR, INITIAL ENCOUNTER (HCC): Primary | ICD-10-CM

## 2021-02-05 DIAGNOSIS — M25.511 ACUTE PAIN OF RIGHT SHOULDER: Primary | ICD-10-CM

## 2021-02-05 LAB
ABO/RH: NORMAL
ANTIBODY SCREEN: NORMAL
APTT: 22.6 SEC (ref 24.5–35.1)
BASOPHILS ABSOLUTE: 0.04 E9/L (ref 0–0.2)
BASOPHILS RELATIVE PERCENT: 0.4 % (ref 0–2)
EKG ATRIAL RATE: 89 BPM
EKG P AXIS: 65 DEGREES
EKG P-R INTERVAL: 166 MS
EKG Q-T INTERVAL: 394 MS
EKG QRS DURATION: 116 MS
EKG QTC CALCULATION (BAZETT): 479 MS
EKG R AXIS: -43 DEGREES
EKG T AXIS: 89 DEGREES
EKG VENTRICULAR RATE: 89 BPM
EOSINOPHILS ABSOLUTE: 0.03 E9/L (ref 0.05–0.5)
EOSINOPHILS RELATIVE PERCENT: 0.3 % (ref 0–6)
HCT VFR BLD CALC: 37.2 % (ref 34–48)
HEMOGLOBIN: 12.1 G/DL (ref 11.5–15.5)
IMMATURE GRANULOCYTES #: 0.04 E9/L
IMMATURE GRANULOCYTES %: 0.4 % (ref 0–5)
INR BLD: 1.1
LYMPHOCYTES ABSOLUTE: 0.65 E9/L (ref 1.5–4)
LYMPHOCYTES RELATIVE PERCENT: 7.3 % (ref 20–42)
MCH RBC QN AUTO: 30.9 PG (ref 26–35)
MCHC RBC AUTO-ENTMCNC: 32.5 % (ref 32–34.5)
MCV RBC AUTO: 95.1 FL (ref 80–99.9)
MONOCYTES ABSOLUTE: 0.62 E9/L (ref 0.1–0.95)
MONOCYTES RELATIVE PERCENT: 7 % (ref 2–12)
NEUTROPHILS ABSOLUTE: 7.53 E9/L (ref 1.8–7.3)
NEUTROPHILS RELATIVE PERCENT: 84.6 % (ref 43–80)
PDW BLD-RTO: 12.6 FL (ref 11.5–15)
PLATELET # BLD: 229 E9/L (ref 130–450)
PMV BLD AUTO: 9.8 FL (ref 7–12)
PROTHROMBIN TIME: 12.1 SEC (ref 9.3–12.4)
RBC # BLD: 3.91 E12/L (ref 3.5–5.5)
WBC # BLD: 8.9 E9/L (ref 4.5–11.5)

## 2021-02-05 PROCEDURE — 85025 COMPLETE CBC W/AUTO DIFF WBC: CPT

## 2021-02-05 PROCEDURE — 73502 X-RAY EXAM HIP UNI 2-3 VIEWS: CPT

## 2021-02-05 PROCEDURE — 86901 BLOOD TYPING SEROLOGIC RH(D): CPT

## 2021-02-05 PROCEDURE — 86900 BLOOD TYPING SEROLOGIC ABO: CPT

## 2021-02-05 PROCEDURE — 6360000002 HC RX W HCPCS: Performed by: EMERGENCY MEDICINE

## 2021-02-05 PROCEDURE — 93005 ELECTROCARDIOGRAM TRACING: CPT | Performed by: EMERGENCY MEDICINE

## 2021-02-05 PROCEDURE — 85610 PROTHROMBIN TIME: CPT

## 2021-02-05 PROCEDURE — 99285 EMERGENCY DEPT VISIT HI MDM: CPT

## 2021-02-05 PROCEDURE — 71045 X-RAY EXAM CHEST 1 VIEW: CPT

## 2021-02-05 PROCEDURE — 1200000000 HC SEMI PRIVATE

## 2021-02-05 PROCEDURE — 86850 RBC ANTIBODY SCREEN: CPT

## 2021-02-05 PROCEDURE — 36415 COLL VENOUS BLD VENIPUNCTURE: CPT

## 2021-02-05 PROCEDURE — 85730 THROMBOPLASTIN TIME PARTIAL: CPT

## 2021-02-05 PROCEDURE — 99232 SBSQ HOSP IP/OBS MODERATE 35: CPT | Performed by: ORTHOPAEDIC SURGERY

## 2021-02-05 PROCEDURE — 73552 X-RAY EXAM OF FEMUR 2/>: CPT

## 2021-02-05 PROCEDURE — 6360000002 HC RX W HCPCS: Performed by: INTERNAL MEDICINE

## 2021-02-05 RX ORDER — ACETAMINOPHEN 325 MG/1
650 TABLET ORAL EVERY 4 HOURS PRN
Status: DISCONTINUED | OUTPATIENT
Start: 2021-02-05 | End: 2021-02-09 | Stop reason: HOSPADM

## 2021-02-05 RX ORDER — LEVOTHYROXINE SODIUM 88 UG/1
88 TABLET ORAL DAILY
Status: DISCONTINUED | OUTPATIENT
Start: 2021-02-06 | End: 2021-02-09 | Stop reason: HOSPADM

## 2021-02-05 RX ORDER — MORPHINE SULFATE 2 MG/ML
1 INJECTION, SOLUTION INTRAMUSCULAR; INTRAVENOUS EVERY 4 HOURS PRN
Status: DISCONTINUED | OUTPATIENT
Start: 2021-02-05 | End: 2021-02-05 | Stop reason: CLARIF

## 2021-02-05 RX ORDER — MORPHINE SULFATE 2 MG/ML
2 INJECTION, SOLUTION INTRAMUSCULAR; INTRAVENOUS EVERY 4 HOURS PRN
Status: DISCONTINUED | OUTPATIENT
Start: 2021-02-05 | End: 2021-02-09 | Stop reason: HOSPADM

## 2021-02-05 RX ORDER — ONDANSETRON 2 MG/ML
4 INJECTION INTRAMUSCULAR; INTRAVENOUS EVERY 6 HOURS PRN
Status: DISCONTINUED | OUTPATIENT
Start: 2021-02-05 | End: 2021-02-07 | Stop reason: SDUPTHER

## 2021-02-05 RX ORDER — MORPHINE SULFATE 2 MG/ML
2 INJECTION, SOLUTION INTRAMUSCULAR; INTRAVENOUS EVERY 4 HOURS PRN
Status: DISCONTINUED | OUTPATIENT
Start: 2021-02-05 | End: 2021-02-05 | Stop reason: CLARIF

## 2021-02-05 RX ORDER — MORPHINE SULFATE 2 MG/ML
1 INJECTION, SOLUTION INTRAMUSCULAR; INTRAVENOUS EVERY 4 HOURS PRN
Status: DISCONTINUED | OUTPATIENT
Start: 2021-02-05 | End: 2021-02-09 | Stop reason: HOSPADM

## 2021-02-05 RX ORDER — FENTANYL CITRATE 50 UG/ML
50 INJECTION, SOLUTION INTRAMUSCULAR; INTRAVENOUS ONCE
Status: COMPLETED | OUTPATIENT
Start: 2021-02-05 | End: 2021-02-05

## 2021-02-05 RX ORDER — CALCIUM CARBONATE 200(500)MG
500 TABLET,CHEWABLE ORAL 3 TIMES DAILY PRN
Status: DISCONTINUED | OUTPATIENT
Start: 2021-02-05 | End: 2021-02-09 | Stop reason: HOSPADM

## 2021-02-05 RX ORDER — ATORVASTATIN CALCIUM 40 MG/1
40 TABLET, FILM COATED ORAL NIGHTLY
Status: DISCONTINUED | OUTPATIENT
Start: 2021-02-06 | End: 2021-02-09 | Stop reason: HOSPADM

## 2021-02-05 RX ADMIN — MORPHINE SULFATE 2 MG: 2 INJECTION, SOLUTION INTRAMUSCULAR; INTRAVENOUS at 22:07

## 2021-02-05 RX ADMIN — FENTANYL CITRATE 50 MCG: 50 INJECTION INTRAMUSCULAR; INTRAVENOUS at 19:44

## 2021-02-05 ASSESSMENT — ENCOUNTER SYMPTOMS
ABDOMINAL PAIN: 0
BACK PAIN: 0
VOMITING: 0
NAUSEA: 0
SHORTNESS OF BREATH: 0

## 2021-02-05 ASSESSMENT — PAIN DESCRIPTION - DESCRIPTORS: DESCRIPTORS: DISCOMFORT;SHOOTING

## 2021-02-05 ASSESSMENT — PAIN SCALES - GENERAL
PAINLEVEL_OUTOF10: 9
PAINLEVEL_OUTOF10: 2

## 2021-02-05 ASSESSMENT — PAIN DESCRIPTION - ORIENTATION
ORIENTATION: LEFT
ORIENTATION: LEFT

## 2021-02-05 ASSESSMENT — PAIN DESCRIPTION - PAIN TYPE: TYPE: ACUTE PAIN

## 2021-02-05 NOTE — ED PROVIDER NOTES
Abdomen is soft. Tenderness: There is no abdominal tenderness. There is no guarding or rebound. Musculoskeletal:      Comments: No tenderness of the thoracic or lumbar spine. Patient does have tenderness to palpation on the lateral aspect of the left hip. Skin:     General: Skin is warm and dry. Neurological:      Mental Status: She is alert and oriented to person, place, and time. Sensory: No sensory deficit ( Sensation grossly intact in lower extremities). Procedures     MDM   Patient presented to the ED for evaluation of fall. Outpatient films did show a subcapital femur fracture on the left. Repeat films here do show a femoral neck fracture. She is agreeable to admission for further treatment evaluation. Discussed case with PCP in the orthopedic team who are agreeable to consult. Patient has been resting comfortably and did not require anything for pain control while in the ED. ED Course as of Feb 05 1837 Fri Feb 05, 2021   1635 Resting in bed in no distress. Discussed results of left thus far. [MS]   0460 Discussed results of imaging with patient showing hip fracture. She is seen Dr. William Gray before. Will place consult to her family physician as well as orthopedics.     [MS]      ED Course User Index  [MS] Art Yeung DO     EKG Interpretation    Interpreted by emergency department physician    Rhythm: normal sinus   Rate: normal  Axis: left  Ectopy: none  Conduction: left bundle branch block (incomplete)  ST Segments: no acute change  T Waves: no acute change  Q Waves: lateral leads and inferior leads    Clinical Impression: no acute changes    Art Yeung    --------------------------------------------- PAST HISTORY ---------------------------------------------  Past Medical History:  has a past medical history of Cerebral aneurysm, Fracture of one rib, right side, subsequent encounter for fracture with routine healing, Hyperlipidemia, Hypertension, Left bundle branch block, Nondisplaced fracture of shaft of right clavicle, and Thyroid disease. Past Surgical History:  has a past surgical history that includes Hysterectomy. Social History:  reports that she has never smoked. She has never used smokeless tobacco. She reports that she does not drink alcohol. Family History: family history is not on file. The patients home medications have been reviewed. Allergies: Pcn [penicillins]    -------------------------------------------------- RESULTS -------------------------------------------------    LABS:  Results for orders placed or performed during the hospital encounter of 02/05/21   CBC Auto Differential   Result Value Ref Range    WBC 8.9 4.5 - 11.5 E9/L    RBC 3.91 3.50 - 5.50 E12/L    Hemoglobin 12.1 11.5 - 15.5 g/dL    Hematocrit 37.2 34.0 - 48.0 %    MCV 95.1 80.0 - 99.9 fL    MCH 30.9 26.0 - 35.0 pg    MCHC 32.5 32.0 - 34.5 %    RDW 12.6 11.5 - 15.0 fL    Platelets 494 338 - 680 E9/L    MPV 9.8 7.0 - 12.0 fL    Neutrophils % 84.6 (H) 43.0 - 80.0 %    Immature Granulocytes % 0.4 0.0 - 5.0 %    Lymphocytes % 7.3 (L) 20.0 - 42.0 %    Monocytes % 7.0 2.0 - 12.0 %    Eosinophils % 0.3 0.0 - 6.0 %    Basophils % 0.4 0.0 - 2.0 %    Neutrophils Absolute 7.53 (H) 1.80 - 7.30 E9/L    Immature Granulocytes # 0.04 E9/L    Lymphocytes Absolute 0.65 (L) 1.50 - 4.00 E9/L    Monocytes Absolute 0.62 0.10 - 0.95 E9/L    Eosinophils Absolute 0.03 (L) 0.05 - 0.50 E9/L    Basophils Absolute 0.04 0.00 - 0.20 E9/L   Protime-INR   Result Value Ref Range    Protime 12.1 9.3 - 12.4 sec    INR 1.1    APTT   Result Value Ref Range    aPTT 22.6 (L) 24.5 - 35.1 sec   EKG 12 Lead   Result Value Ref Range    Ventricular Rate 89 BPM    Atrial Rate 89 BPM    P-R Interval 166 ms    QRS Duration 116 ms    Q-T Interval 394 ms    QTc Calculation (Bazett) 479 ms    P Axis 65 degrees    R Axis -43 degrees    T Axis 89 degrees       RADIOLOGY:  XR CHEST 1 VIEW   Final Result   1.   Borderline consultation. This patient's ED course included: a personal history and physicial examination, re-evaluation prior to disposition, multiple bedside re-evaluations, IV medications and complex medical decision making and emergency management    This patient has remained hemodynamically stable during their ED course. Diagnosis:  1. Closed fracture of neck of left femur, initial encounter ([facility] [address]        Disposition:  Patient's disposition: Admit to med/surg floor  Patient's condition is fair.          Daisy Casper, DO  02/05/21 Tolu 69, DO  02/05/21 Tolu 69, DO  02/05/21 2030

## 2021-02-05 NOTE — ED NOTES
Pt cleaned up and removed from bedpan.  PT had small output of urine and large bowel movement      Shimon Devi RN  02/05/21 6881

## 2021-02-06 ENCOUNTER — APPOINTMENT (OUTPATIENT)
Dept: GENERAL RADIOLOGY | Age: 86
DRG: 522 | End: 2021-02-06
Payer: MEDICARE

## 2021-02-06 LAB
ALBUMIN SERPL-MCNC: 3.5 G/DL (ref 3.5–5.2)
ALP BLD-CCNC: 115 U/L (ref 35–104)
ALT SERPL-CCNC: 14 U/L (ref 0–32)
ANION GAP SERPL CALCULATED.3IONS-SCNC: 10 MMOL/L (ref 7–16)
AST SERPL-CCNC: 20 U/L (ref 0–31)
BASOPHILS ABSOLUTE: 0.05 E9/L (ref 0–0.2)
BASOPHILS RELATIVE PERCENT: 0.6 % (ref 0–2)
BILIRUB SERPL-MCNC: 0.4 MG/DL (ref 0–1.2)
BUN BLDV-MCNC: 24 MG/DL (ref 8–23)
CALCIUM SERPL-MCNC: 9.4 MG/DL (ref 8.6–10.2)
CHLORIDE BLD-SCNC: 102 MMOL/L (ref 98–107)
CO2: 23 MMOL/L (ref 22–29)
CREAT SERPL-MCNC: 0.8 MG/DL (ref 0.5–1)
EKG ATRIAL RATE: 86 BPM
EKG P AXIS: 68 DEGREES
EKG P-R INTERVAL: 168 MS
EKG Q-T INTERVAL: 390 MS
EKG QRS DURATION: 120 MS
EKG QTC CALCULATION (BAZETT): 466 MS
EKG R AXIS: -49 DEGREES
EKG T AXIS: 94 DEGREES
EKG VENTRICULAR RATE: 86 BPM
EOSINOPHILS ABSOLUTE: 0.08 E9/L (ref 0.05–0.5)
EOSINOPHILS RELATIVE PERCENT: 1 % (ref 0–6)
GFR AFRICAN AMERICAN: >60
GFR NON-AFRICAN AMERICAN: >60 ML/MIN/1.73
GLUCOSE BLD-MCNC: 125 MG/DL (ref 74–99)
HCT VFR BLD CALC: 37.2 % (ref 34–48)
HEMOGLOBIN: 12.1 G/DL (ref 11.5–15.5)
IMMATURE GRANULOCYTES #: 0.03 E9/L
IMMATURE GRANULOCYTES %: 0.4 % (ref 0–5)
LYMPHOCYTES ABSOLUTE: 0.84 E9/L (ref 1.5–4)
LYMPHOCYTES RELATIVE PERCENT: 10.3 % (ref 20–42)
MCH RBC QN AUTO: 30.4 PG (ref 26–35)
MCHC RBC AUTO-ENTMCNC: 32.5 % (ref 32–34.5)
MCV RBC AUTO: 93.5 FL (ref 80–99.9)
MONOCYTES ABSOLUTE: 0.67 E9/L (ref 0.1–0.95)
MONOCYTES RELATIVE PERCENT: 8.2 % (ref 2–12)
NEUTROPHILS ABSOLUTE: 6.51 E9/L (ref 1.8–7.3)
NEUTROPHILS RELATIVE PERCENT: 79.5 % (ref 43–80)
PDW BLD-RTO: 12.3 FL (ref 11.5–15)
PLATELET # BLD: 219 E9/L (ref 130–450)
PMV BLD AUTO: 10.3 FL (ref 7–12)
POTASSIUM SERPL-SCNC: 4.1 MMOL/L (ref 3.5–5)
RBC # BLD: 3.98 E12/L (ref 3.5–5.5)
SODIUM BLD-SCNC: 135 MMOL/L (ref 132–146)
TOTAL PROTEIN: 6.9 G/DL (ref 6.4–8.3)
WBC # BLD: 8.2 E9/L (ref 4.5–11.5)

## 2021-02-06 PROCEDURE — 6360000002 HC RX W HCPCS: Performed by: INTERNAL MEDICINE

## 2021-02-06 PROCEDURE — 36415 COLL VENOUS BLD VENIPUNCTURE: CPT

## 2021-02-06 PROCEDURE — 80053 COMPREHEN METABOLIC PANEL: CPT

## 2021-02-06 PROCEDURE — 73000 X-RAY EXAM OF COLLAR BONE: CPT

## 2021-02-06 PROCEDURE — 71045 X-RAY EXAM CHEST 1 VIEW: CPT

## 2021-02-06 PROCEDURE — 6370000000 HC RX 637 (ALT 250 FOR IP): Performed by: INTERNAL MEDICINE

## 2021-02-06 PROCEDURE — 1200000000 HC SEMI PRIVATE

## 2021-02-06 PROCEDURE — 99231 SBSQ HOSP IP/OBS SF/LOW 25: CPT | Performed by: ORTHOPAEDIC SURGERY

## 2021-02-06 PROCEDURE — 73030 X-RAY EXAM OF SHOULDER: CPT

## 2021-02-06 PROCEDURE — 85025 COMPLETE CBC W/AUTO DIFF WBC: CPT

## 2021-02-06 PROCEDURE — 93005 ELECTROCARDIOGRAM TRACING: CPT | Performed by: INTERNAL MEDICINE

## 2021-02-06 RX ADMIN — METOPROLOL TARTRATE 25 MG: 25 TABLET, FILM COATED ORAL at 08:41

## 2021-02-06 RX ADMIN — MORPHINE SULFATE 2 MG: 2 INJECTION, SOLUTION INTRAMUSCULAR; INTRAVENOUS at 04:06

## 2021-02-06 RX ADMIN — DESMOPRESSIN ACETATE 40 MG: 0.2 TABLET ORAL at 20:15

## 2021-02-06 RX ADMIN — LEVOTHYROXINE SODIUM 88 MCG: 0.09 TABLET ORAL at 05:38

## 2021-02-06 ASSESSMENT — PAIN SCALES - GENERAL: PAINLEVEL_OUTOF10: 10

## 2021-02-06 ASSESSMENT — PAIN DESCRIPTION - PAIN TYPE: TYPE: ACUTE PAIN

## 2021-02-06 ASSESSMENT — PAIN DESCRIPTION - LOCATION: LOCATION: LEG

## 2021-02-06 ASSESSMENT — PAIN DESCRIPTION - FREQUENCY: FREQUENCY: INTERMITTENT

## 2021-02-06 NOTE — PROGRESS NOTES
Department of Orthopedic Surgery  Resident Progress Note    Patient seen and examined. Pain controlled. No new complaints. Denies chest pain, shortness of breath, dizziness/lightheadedness. Doing well today. Pain is controlled     VITALS:  /78   Pulse 78   Temp 97.9 °F (36.6 °C) (Oral)   Resp 18   Ht 5' 5\" (1.651 m)   Wt 112 lb 3.2 oz (50.9 kg)   SpO2 94%   BMI 18.67 kg/m²     General: alert and oriented to person, place and time, well-developed and well-nourished, in no acute distress    MUSCULOSKELETAL:   left lower extremity:  · Compartments soft and compressible  · +PF/DF/EHL  · +2/4 DP & PT pulses, Brisk Cap refill, Toes warm and perfused  · Distal sensation grossly intact to Peroneals, Sural, Saphenous, and tibial nrs    CBC:   Lab Results   Component Value Date    WBC 8.2 02/06/2021    HGB 12.1 02/06/2021    HCT 37.2 02/06/2021     02/06/2021     PT/INR:    Lab Results   Component Value Date    PROTIME 12.1 02/05/2021    INR 1.1 02/05/2021       ASSESSMENT  · S/P Left femoral neck fracture    PLAN      · Continue physical therapy and protocol: NWB - LLE  · Pre-op labs  · Deep venous thrombosis prophylaxis - hold for surgery, early mobilization  · PT/OT  · Pain Control: IV and PO  · Monitor H&H  · Plan for OR tomorrow 2/7 for donaldo hip arthroplasty. · NPO after midnight  · I was present with the resident during the history and exam. I discussed the case with the resident and agree with the findings and plan as documented in the resident's note.

## 2021-02-06 NOTE — CARE COORDINATION
SS NOTE: COVID NEGATIVE 01/06. This pt was in a SNF stay at [address] [place] [address] (had been there for rehab approx 4 weeks). A bed is being held there for her return when stable for [facility]. Pt will need NO PRECERT, a signed HIEN and current PT/OT. Pt sleeping today so SW spoke with her friend who is also her Danyelle An, Sascha Marco. Stuart Taylor relates that pt has no family. She relates that pt has short term memory loss. Plan is for her to return to [employer] at [facility]. SS to continue. Mahnaz De Los Santos. 2/6/2021.10:57 AM,

## 2021-02-06 NOTE — CONSULTS
Department of Orthopedic Surgery  Resident Consult Note          Reason for Consult: Left hip pain    HISTORY OF PRESENT ILLNESS:       Patient is a 80 y.o. female who presents with left hip pain. Although awake and alert, patient is an inconsistent historian. Per chart, patient was in a nursing home when she fell onto her left side. X-rays at the nursing facility demonstrated a left hip fracture and she was sent to the ED for further management. States that she used to walk with a wheeled walker, however she has not been using it recently. Denies anticoagulation. Denies pertinent medical history. Denies numbness/tingling/paresthesias. Denies any other orthopedic complaints at this time. Past Medical History:        Diagnosis Date    Cerebral aneurysm     Fracture of one rib, right side, subsequent encounter for fracture with routine healing     Hyperlipidemia     Hypertension     Left bundle branch block     Nondisplaced fracture of shaft of right clavicle     Thyroid disease      Past Surgical History:        Procedure Laterality Date    HYSTERECTOMY       Current Medications:   No current facility-administered medications for this encounter. Allergies:  Pcn [penicillins]    Social History:   TOBACCO:   reports that she has never smoked. She has never used smokeless tobacco.  ETOH:   reports no history of alcohol use. DRUGS:   has no history on file for drug. ACTIVITIES OF DAILY LIVING:    OCCUPATION:    Family History:   No family history on file.     REVIEW OF SYSTEMS:  CONSTITUTIONAL:  negative for  fevers, chills  EYES:  negative for blurred vision, visual disturbance  HEENT:  negative for  hearing loss, voice change  RESPIRATORY:  negative for  dyspnea, wheezing  CARDIOVASCULAR:  negative for  chest pain, palpitations  GASTROINTESTINAL:  negative for nausea, vomiting  GENITOURINARY:  negative for frequency, urinary incontinence  HEMATOLOGIC/LYMPHATIC:  negative for bleeding and

## 2021-02-06 NOTE — H&P
PHYSICAL EXAMINATION:  GENERAL APPEARANCE:  A 70-year-old frail lady in no acute distress. VITAL SIGNS:  Temperature 97.9, pulse 78 per minute and regular,  respiratory rate 18 per minute, blood pressure 138/78, and saturation  oxygen 96% on room air. HEENT:  Normocephalic, atraumatic. Pupils are equal and reactive. NECK:  Supple. No lymphadenopathy. No thyromegaly. CHEST:  Clear to auscultation bilaterally. HEART:  Regular rate and rhythm. No rub or gallop noted. ABDOMEN:  Soft, nontender, and scaphoid. No organomegaly. Bowel sounds  are present. EXTREMITIES:  There is no edema, clubbing, or cyanosis. She does have  shortness and external rotation of the left lower extremity. NEUROLOGIC:  The patient is awake. She is alert. She is oriented to  self. Not oriented to time and person. She knows she is in the  hospital.  MUSCULOSKELETAL:  Left lower extremity is shorter and externally  rotated. She has osteophytic changes of both hands. LABORATORY DATA:  Sodium 135, potassium 4.1, chloride 102, bicarb 23. BUN 24, creatinine 0.8. White cell count 8.9, hemoglobin 12.1,  hematocrit 37.2, and platelet count 691. Her chest x-ray shows borderline cardiomegaly. It shows also remote  fractures of the proximal right humerus and fracture of the right  clavicle, questionable _____ dislocation of the right shoulder. Her EKG  shows sinus rhythm, possible left atrial enlargement, left axis  deviation. I think complete left bundle-branch block which is unchanged  from 01/04/2021. ASSESSMENT AND PLAN:  1. Status post fall. 2.  Left femoral neck fracture. The patient currently admitted to the  hospital.  Orthopedic consultation obtained and plan is for open  reduction and internal fixation tomorrow. The patient has no cardiac  symptoms and her EKG is unchanged from previous. She is medically  stable to undergo surgery. 3.  History of hypertension.   We will continue with metoprolol succinate ER 25 mg daily and monitor her blood pressure closely. 4.  Hypothyroidism. Continue levothyroxine 88 mcg a day. 5.  History of hyperlipidemia. Continue simvastatin 40 mg nightly. 6.  Underlying senile dementia. The patient at her baseline unchanged  from exam few weeks ago at nursing home.         Corby Melendrez MD    D: 02/06/2021 12:14:51       T: 02/06/2021 13:49:34     [email or web address]  Job#: [id number]     Doc#: [id number]    CC:

## 2021-02-07 ENCOUNTER — APPOINTMENT (OUTPATIENT)
Dept: GENERAL RADIOLOGY | Age: 86
DRG: 522 | End: 2021-02-07
Payer: MEDICARE

## 2021-02-07 ENCOUNTER — ANESTHESIA (OUTPATIENT)
Dept: OPERATING ROOM | Age: 86
DRG: 522 | End: 2021-02-07
Payer: MEDICARE

## 2021-02-07 ENCOUNTER — ANESTHESIA EVENT (OUTPATIENT)
Dept: OPERATING ROOM | Age: 86
DRG: 522 | End: 2021-02-07
Payer: MEDICARE

## 2021-02-07 VITALS
DIASTOLIC BLOOD PRESSURE: 56 MMHG | SYSTOLIC BLOOD PRESSURE: 111 MMHG | OXYGEN SATURATION: 96 % | RESPIRATION RATE: 13 BRPM

## 2021-02-07 PROCEDURE — 27269 OPTX THIGH FX: CPT | Performed by: ORTHOPAEDIC SURGERY

## 2021-02-07 PROCEDURE — 2500000003 HC RX 250 WO HCPCS: Performed by: ORTHOPAEDIC SURGERY

## 2021-02-07 PROCEDURE — 88305 TISSUE EXAM BY PATHOLOGIST: CPT

## 2021-02-07 PROCEDURE — 6370000000 HC RX 637 (ALT 250 FOR IP): Performed by: STUDENT IN AN ORGANIZED HEALTH CARE EDUCATION/TRAINING PROGRAM

## 2021-02-07 PROCEDURE — 3700000001 HC ADD 15 MINUTES (ANESTHESIA): Performed by: ORTHOPAEDIC SURGERY

## 2021-02-07 PROCEDURE — 6360000002 HC RX W HCPCS: Performed by: ORTHOPAEDIC SURGERY

## 2021-02-07 PROCEDURE — 97112 NEUROMUSCULAR REEDUCATION: CPT | Performed by: PHYSICAL THERAPIST

## 2021-02-07 PROCEDURE — 2580000003 HC RX 258: Performed by: ORTHOPAEDIC SURGERY

## 2021-02-07 PROCEDURE — 3600000005 HC SURGERY LEVEL 5 BASE: Performed by: ORTHOPAEDIC SURGERY

## 2021-02-07 PROCEDURE — 7100000001 HC PACU RECOVERY - ADDTL 15 MIN: Performed by: ORTHOPAEDIC SURGERY

## 2021-02-07 PROCEDURE — 88311 DECALCIFY TISSUE: CPT

## 2021-02-07 PROCEDURE — 3700000000 HC ANESTHESIA ATTENDED CARE: Performed by: ORTHOPAEDIC SURGERY

## 2021-02-07 PROCEDURE — 2709999900 HC NON-CHARGEABLE SUPPLY: Performed by: ORTHOPAEDIC SURGERY

## 2021-02-07 PROCEDURE — 73502 X-RAY EXAM HIP UNI 2-3 VIEWS: CPT

## 2021-02-07 PROCEDURE — 6360000002 HC RX W HCPCS: Performed by: STUDENT IN AN ORGANIZED HEALTH CARE EDUCATION/TRAINING PROGRAM

## 2021-02-07 PROCEDURE — 1200000000 HC SEMI PRIVATE

## 2021-02-07 PROCEDURE — 6360000002 HC RX W HCPCS

## 2021-02-07 PROCEDURE — 0SRS0JA REPLACEMENT OF LEFT HIP JOINT, FEMORAL SURFACE WITH SYNTHETIC SUBSTITUTE, UNCEMENTED, OPEN APPROACH: ICD-10-PCS | Performed by: ORTHOPAEDIC SURGERY

## 2021-02-07 PROCEDURE — 97161 PT EVAL LOW COMPLEX 20 MIN: CPT | Performed by: PHYSICAL THERAPIST

## 2021-02-07 PROCEDURE — 2580000003 HC RX 258

## 2021-02-07 PROCEDURE — 6370000000 HC RX 637 (ALT 250 FOR IP): Performed by: INTERNAL MEDICINE

## 2021-02-07 PROCEDURE — 7100000000 HC PACU RECOVERY - FIRST 15 MIN: Performed by: ORTHOPAEDIC SURGERY

## 2021-02-07 PROCEDURE — C1776 JOINT DEVICE (IMPLANTABLE): HCPCS | Performed by: ORTHOPAEDIC SURGERY

## 2021-02-07 PROCEDURE — 3600000015 HC SURGERY LEVEL 5 ADDTL 15MIN: Performed by: ORTHOPAEDIC SURGERY

## 2021-02-07 DEVICE — IMPL HIP FEM HEAD SELF CTR BIPLR 45X28MM: Type: IMPLANTABLE DEVICE | Site: HIP | Status: FUNCTIONAL

## 2021-02-07 DEVICE — HIP H4 HEMI UNI BIPLR IMPL CAPPED H4: Type: IMPLANTABLE DEVICE | Site: HIP | Status: FUNCTIONAL

## 2021-02-07 DEVICE — STEM FEM SZ 6 L150MM NK L35MM 42MM OFFSET 130DEG BILAT HIP: Type: IMPLANTABLE DEVICE | Site: HIP | Status: FUNCTIONAL

## 2021-02-07 DEVICE — HEAD FEM DIA28MM +12MM OFFSET HIP ULT STD ARTC EZ 12/14: Type: IMPLANTABLE DEVICE | Site: HIP | Status: FUNCTIONAL

## 2021-02-07 RX ORDER — HYDROCODONE BITARTRATE AND ACETAMINOPHEN 5; 325 MG/1; MG/1
1 TABLET ORAL EVERY 4 HOURS PRN
Status: DISCONTINUED | OUTPATIENT
Start: 2021-02-07 | End: 2021-02-09 | Stop reason: HOSPADM

## 2021-02-07 RX ORDER — OXYCODONE HYDROCHLORIDE AND ACETAMINOPHEN 5; 325 MG/1; MG/1
1 TABLET ORAL EVERY 6 HOURS PRN
Qty: 28 TABLET | Refills: 0 | Status: SHIPPED | OUTPATIENT
Start: 2021-02-07 | End: 2021-02-14

## 2021-02-07 RX ORDER — CEFAZOLIN SODIUM 2 G/50ML
2000 SOLUTION INTRAVENOUS EVERY 8 HOURS
Status: COMPLETED | OUTPATIENT
Start: 2021-02-07 | End: 2021-02-08

## 2021-02-07 RX ORDER — SODIUM CHLORIDE 0.9 % (FLUSH) 0.9 %
10 SYRINGE (ML) INJECTION PRN
Status: DISCONTINUED | OUTPATIENT
Start: 2021-02-07 | End: 2021-02-09 | Stop reason: HOSPADM

## 2021-02-07 RX ORDER — VANCOMYCIN HYDROCHLORIDE 1 G/20ML
INJECTION, POWDER, LYOPHILIZED, FOR SOLUTION INTRAVENOUS PRN
Status: DISCONTINUED | OUTPATIENT
Start: 2021-02-07 | End: 2021-02-07 | Stop reason: ALTCHOICE

## 2021-02-07 RX ORDER — ONDANSETRON 2 MG/ML
4 INJECTION INTRAMUSCULAR; INTRAVENOUS EVERY 6 HOURS PRN
Status: DISCONTINUED | OUTPATIENT
Start: 2021-02-07 | End: 2021-02-09 | Stop reason: HOSPADM

## 2021-02-07 RX ORDER — SODIUM CHLORIDE 0.9 % (FLUSH) 0.9 %
10 SYRINGE (ML) INJECTION EVERY 12 HOURS SCHEDULED
Status: DISCONTINUED | OUTPATIENT
Start: 2021-02-07 | End: 2021-02-09 | Stop reason: HOSPADM

## 2021-02-07 RX ORDER — CEFAZOLIN SODIUM 2 G/50ML
SOLUTION INTRAVENOUS
Status: COMPLETED
Start: 2021-02-07 | End: 2021-02-07

## 2021-02-07 RX ORDER — ONDANSETRON 2 MG/ML
INJECTION INTRAMUSCULAR; INTRAVENOUS PRN
Status: DISCONTINUED | OUTPATIENT
Start: 2021-02-07 | End: 2021-02-07 | Stop reason: SDUPTHER

## 2021-02-07 RX ORDER — PROMETHAZINE HYDROCHLORIDE 25 MG/1
12.5 TABLET ORAL EVERY 6 HOURS PRN
Status: DISCONTINUED | OUTPATIENT
Start: 2021-02-07 | End: 2021-02-09 | Stop reason: HOSPADM

## 2021-02-07 RX ORDER — CEFAZOLIN SODIUM 2 G/50ML
SOLUTION INTRAVENOUS PRN
Status: DISCONTINUED | OUTPATIENT
Start: 2021-02-07 | End: 2021-02-07 | Stop reason: SDUPTHER

## 2021-02-07 RX ORDER — SODIUM CHLORIDE 9 MG/ML
INJECTION, SOLUTION INTRAVENOUS CONTINUOUS PRN
Status: DISCONTINUED | OUTPATIENT
Start: 2021-02-07 | End: 2021-02-07 | Stop reason: SDUPTHER

## 2021-02-07 RX ORDER — HYDROCODONE BITARTRATE AND ACETAMINOPHEN 5; 325 MG/1; MG/1
2 TABLET ORAL EVERY 4 HOURS PRN
Status: DISCONTINUED | OUTPATIENT
Start: 2021-02-07 | End: 2021-02-09 | Stop reason: HOSPADM

## 2021-02-07 RX ORDER — LIDOCAINE HYDROCHLORIDE 20 MG/ML
INJECTION, SOLUTION INTRAVENOUS PRN
Status: DISCONTINUED | OUTPATIENT
Start: 2021-02-07 | End: 2021-02-07 | Stop reason: SDUPTHER

## 2021-02-07 RX ORDER — PROPOFOL 10 MG/ML
INJECTION, EMULSION INTRAVENOUS CONTINUOUS PRN
Status: DISCONTINUED | OUTPATIENT
Start: 2021-02-07 | End: 2021-02-07 | Stop reason: SDUPTHER

## 2021-02-07 RX ORDER — FENTANYL CITRATE 50 UG/ML
INJECTION, SOLUTION INTRAMUSCULAR; INTRAVENOUS PRN
Status: DISCONTINUED | OUTPATIENT
Start: 2021-02-07 | End: 2021-02-07 | Stop reason: SDUPTHER

## 2021-02-07 RX ADMIN — CEFAZOLIN SODIUM 2 G: 2 SOLUTION INTRAVENOUS at 08:40

## 2021-02-07 RX ADMIN — PROPOFOL INJECTABLE EMULSION 40 MCG/KG/MIN: 10 INJECTION, EMULSION INTRAVENOUS at 08:40

## 2021-02-07 RX ADMIN — LIDOCAINE HYDROCHLORIDE 20 MG: 20 INJECTION, SOLUTION INTRAVENOUS at 08:40

## 2021-02-07 RX ADMIN — DESMOPRESSIN ACETATE 40 MG: 0.2 TABLET ORAL at 20:45

## 2021-02-07 RX ADMIN — PHENYLEPHRINE HYDROCHLORIDE 100 MCG: 10 INJECTION INTRAVENOUS at 09:05

## 2021-02-07 RX ADMIN — PHENYLEPHRINE HYDROCHLORIDE 100 MCG: 10 INJECTION INTRAVENOUS at 08:53

## 2021-02-07 RX ADMIN — CEFAZOLIN SODIUM 2000 MG: 2 SOLUTION INTRAVENOUS at 10:55

## 2021-02-07 RX ADMIN — SODIUM CHLORIDE: 9 INJECTION, SOLUTION INTRAVENOUS at 07:49

## 2021-02-07 RX ADMIN — ASPIRIN 325 MG: 325 TABLET, COATED ORAL at 20:45

## 2021-02-07 RX ADMIN — ONDANSETRON 4 MG: 2 INJECTION INTRAMUSCULAR; INTRAVENOUS at 09:52

## 2021-02-07 RX ADMIN — SODIUM CHLORIDE: 9 INJECTION, SOLUTION INTRAVENOUS at 09:51

## 2021-02-07 RX ADMIN — CEFAZOLIN SODIUM 2000 MG: 2 SOLUTION INTRAVENOUS at 16:43

## 2021-02-07 RX ADMIN — METOPROLOL TARTRATE 25 MG: 25 TABLET, FILM COATED ORAL at 07:36

## 2021-02-07 RX ADMIN — FENTANYL CITRATE 25 MCG: 50 INJECTION, SOLUTION INTRAMUSCULAR; INTRAVENOUS at 08:40

## 2021-02-07 ASSESSMENT — PULMONARY FUNCTION TESTS
PIF_VALUE: 1
PIF_VALUE: 1
PIF_VALUE: 0
PIF_VALUE: 1
PIF_VALUE: 0
PIF_VALUE: 2
PIF_VALUE: 2
PIF_VALUE: 1
PIF_VALUE: 0
PIF_VALUE: 2
PIF_VALUE: 1
PIF_VALUE: 1
PIF_VALUE: 2
PIF_VALUE: 2
PIF_VALUE: 0
PIF_VALUE: 1
PIF_VALUE: 1
PIF_VALUE: 0
PIF_VALUE: 1
PIF_VALUE: 0
PIF_VALUE: 2
PIF_VALUE: 1
PIF_VALUE: 1
PIF_VALUE: 0
PIF_VALUE: 0
PIF_VALUE: 2
PIF_VALUE: 2
PIF_VALUE: 1
PIF_VALUE: 2
PIF_VALUE: 1
PIF_VALUE: 2
PIF_VALUE: 1
PIF_VALUE: 0
PIF_VALUE: 1
PIF_VALUE: 2
PIF_VALUE: 0
PIF_VALUE: 2
PIF_VALUE: 1
PIF_VALUE: 2
PIF_VALUE: 1
PIF_VALUE: 1
PIF_VALUE: 0
PIF_VALUE: 1
PIF_VALUE: 1
PIF_VALUE: 2
PIF_VALUE: 1
PIF_VALUE: 1
PIF_VALUE: 0
PIF_VALUE: 1
PIF_VALUE: 1
PIF_VALUE: 0
PIF_VALUE: 1
PIF_VALUE: 1
PIF_VALUE: 2
PIF_VALUE: 1
PIF_VALUE: 0
PIF_VALUE: 1

## 2021-02-07 ASSESSMENT — PAIN SCALES - GENERAL
PAINLEVEL_OUTOF10: 0
PAINLEVEL_OUTOF10: 0

## 2021-02-07 ASSESSMENT — PAIN DESCRIPTION - PAIN TYPE
TYPE: SURGICAL PAIN
TYPE: SURGICAL PAIN

## 2021-02-07 ASSESSMENT — PAIN DESCRIPTION - LOCATION
LOCATION: HIP
LOCATION: HIP

## 2021-02-07 ASSESSMENT — PAIN DESCRIPTION - ORIENTATION
ORIENTATION: LEFT
ORIENTATION: LEFT

## 2021-02-07 NOTE — OP NOTE
Operative Note      Patient: Candelario Joyner  YOB: 1929  MRN: [id number]    Date of Procedure: 2/7/2021    Pre-Op Diagnosis: LEFT FEMORAL FRACTURE    Post-Op Diagnosis: Same       Procedure(s):  HIP HEMIARTHROPLASTY    Surgeon(s): Eliana Montez DO    Assistant:   Resident: Pincus Severin, DO; Josefa Olvera DO    Anesthesia: Spinal    Estimated Blood Loss (mL): less than 838     Complications: None    Specimens:   ID Type Source Tests Collected by Time Destination   A : LEFT HIP BONE Bone Bone SURGICAL PATHOLOGY Eliana Montez DO 2/7/2021 5733        Implants:  Implant Name Type Inv.  Item Serial No.  Lot No. LRB No. Used Action   STEM FEM SZ 6 L150MM NK L35MM 42MM OFFSET 130DEG BILAT HIP  STEM FEM SZ 6 L150MM NK L35MM 42MM OFFSET 130DEG BILAT HIP  [facility] clipsyncS- [id number] Left 1 Implanted   IMPL HIP FEM HEAD SELF CTR BIPLR [id number] Hip IMPL HIP FEM HEAD SELF CTR BIPLR [id number]  JN: [employer] [id number] Left 1 Implanted   HEAD FEM [id number] +12MM OFFSET HIP ULT STD ARTC EZ 12/14  HEAD FEM [id number] +12MM OFFSET HIP ULT STD ARTC EZ 12/14  [facility] clipsyncS- [id number] Left 1 Implanted         Drains:   Urethral Catheter (Active)       [REMOVED] Urethral Catheter Latex (Removed)   $ Urethral catheter insertion $ Not inserted for procedure 01/04/21 1625   Catheter Indications Hospice/comfort/palliative care 01/04/21 1625   Site Assessment [place] 01/04/21 2300   Urine Color Yellow 01/06/21 0800   Urine Appearance Clear 01/06/21 0800   Output (mL) 125 mL 01/06/21 1516       Findings: as above    Detailed Description of Procedure:   Below    PREOPERATIVE DIAGNOSIS:  left femoral neck fracture.     POSTOPERATIVE DIAGNOSIS:  As above     OPERATION PERFORMED:  left hip  hemiarthroplasty.     SURGEON:  Aaron Nuno DO     ASSISTANTS:  As above     ANESTHESIA:  spinal     ESTIMATED BLOOD LOSS:  790 cc     COMPLICATIONS:  None.     SPECIMENS:  Femoral head.    Implants: as above     INDICATIONS:  Tfter workup in the emergency department  including x-rays showed left displaced femoral neck fracture,  therefore, based upon the physical exam as well as the findings, left  hip hemiarthroplasty was recommended to the patient and the family. All risks, benefits, and alternatives to treatment were discussed with  the family and they elected to proceed with surgery at this time.     OPERATIVE PROCEDURE:  Outside the operative suite the patient was seen  and marked. The site was signed. The patient was then brought to the  operative suite and placed on the operative table. Anesthesia  performed  anesthesia with sedatives, and then the patient was  positioned on the table in the lateral decubitus position with the hip  facing upwards. All bony prominences were well padded. An axillary  roll was placed. The left hip and leg were prepped with Chloraprep  and the leg was draped in a sterile fashion. A lateral incision was  made over the greater trochanter and carried down to the fascia using  electrocautery for hemostasis. At this point, a Gross elevator was  used to clear all the subcutaneous tissue off of the IT fascia. A  small rent was placed in the IT fascia using the electrocautery and  then the fibers of the IT fascia were then divided using Avery scissors  in line with the skin incision. At this point, a blunt curved Hohmann  retractor was placed on the gluteus medius tendon exposing the  parameters of the piriformis tendon. At this point this was released  with electrocautery and tagged with a tag stitch. The other external  rotators were then released off of their femoral insertions and gently  released from the posterior hip capsule. Once the posterior hip  capsule was exposed, it was then teed up to the acetabulum down to the  femoral neck exposing the femoral head and femoral neck fracture.   At  this point, Gross retractor was placed into the joint and the femoral  head was then removed. An oscillating saw was then used to carefully  cut the femoral neck at a 45-degree angle approximately a  fingerbreadth above the lesser trochanter. All bony fragments were  then removed. Then a trial femoral head was then placed in the  acetabulum to confirm size. Next, the proxiaml femur was prepared. A  was then used to get  our starting point in the femur for the femoral stem. This was  followed by a canal finding reamer and the reamer size was then  increased to the appropriate size. This was followed by broaching. Broaching started at size 1 broach and continued up to a size 6. This  was not found to be appropriately stable and so at this point a  decision was made to press fit the femoral stem. At this  point, we did a trial of the component and this was found to be  appropriately stable in all planes of motion. The trial head and stem were then removed. The wound was thoroughly irrigated. And the final stem and bipolar head implant was  impacted into position. The femoral head was then reduced to the acetabulum   and again it was found to be appropriately  stable. Then the wound was irrigated thoroughly once more with  sterile saline. At this point, the hip capsule was then repaired  using FiberTape suture, 0.5 gm of vancomycin powder was placed in the  wound. At this point all retractors were taken out and the IT fascia  was then repaired using a Stratafix suture, subcu tissues were closed  with 2-0 Vicryl and staples were used to close the skin. A sterile  JI dressing was placed over the hip. The patient was then  transferred back to her hospital bed and awakened from her spinal  anesthesia. She was taken to the PACU in stable condition.     POSTOPERATIVE PLAN:  The patient will be made weightbearing as  tolerated on the right lower extremity.   She was given a dose of Ancef  preoperatively and will receive two more doses of this over the  24-hour period postoperatively for infection prophylaxis.   She will  also be started on daily asa for DVT prophylaxis and will   begin physical therapy tomorrow.       Electronically signed by Giovanna Clemente DO on 2/7/2021 at 9:56 AM

## 2021-02-07 NOTE — ANESTHESIA POSTPROCEDURE EVALUATION
Department of Anesthesiology  Postprocedure Note    Patient: Josef Cantu  MRN: [id number]  YOB: 1929  Date of evaluation: 2/7/2021  Time:  11:06 AM     Procedure Summary     Date: 02/07/21 Room / Location: [address] [place] [address] / [address] [place] [address]    Anesthesia Start: 7337 Anesthesia Stop: 7055    Procedure: HIP HEMIARTHROPLASTY (Left ) Diagnosis: (LEFT FEMORAL FRACTURE)    Surgeons: Emilia Bradley DO Responsible Provider: Vanesa Medellin MD    Anesthesia Type: spinal, MAC ASA Status: 3          Anesthesia Type: spinal, MAC    Bhavesh Phase I: Bhavesh Score: 9    Bhavesh Phase II:      Last vitals: Reviewed and per EMR flowsheets.        Anesthesia Post Evaluation    Patient location during evaluation: PACU  Patient participation: complete - patient participated  Level of consciousness: awake  Pain score: 2  Airway patency: patent  Nausea & Vomiting: no nausea  Complications: no  Cardiovascular status: blood pressure returned to baseline  Respiratory status: acceptable  Hydration status: euvolemic

## 2021-02-07 NOTE — ANESTHESIA PROCEDURE NOTES
Spinal Block    Patient location during procedure: OR  Start time: 2/7/2021 8:15 AM  End time: 2/7/2021 8:25 AM  Reason for block: primary anesthetic  Staffing  Performed: anesthesiologist   Anesthesiologist: Rosario Han MD  Resident/CRNA: AUDELIA Martell CRNA  Preanesthetic Checklist  Completed: patient identified, IV checked, site marked, risks and benefits discussed, surgical consent, monitors and equipment checked, pre-op evaluation, timeout performed, anesthesia consent given, oxygen available and patient being monitored  Spinal Block  Patient position: left lateral decubitus  Prep: Betadine  Patient monitoring: cardiac monitor and continuous pulse ox  Approach: left paramedian  Location: L4/L5  Guidance: paresthesia technique  Provider prep: mask and sterile gloves  Agent: bupivacaine  Dose: 1.6  Dose: 1.6  Needle  Needle type: Quincke   Needle gauge: 22 G  Needle length: 3.5 in  Assessment  Sensory level: T6  Events: cerebrospinal fluid  Swirl obtained: Yes  CSF: clear  Attempts: 3+  Hemodynamics: stable

## 2021-02-07 NOTE — PROGRESS NOTES
Physical Therapy    Physical Therapy Initial Evaluation    Room #:  1174/9946-27  Patient Name: Edgardo Porter  YOB: 1929  MRN: [id number]    Referring Provider:    Portillo Graham DO    Date of Service: 2/7/2021    Evaluating Physical Therapist: Vdihi Almodovar, PT  #[id number]       Diagnosis:   Displaced fracture of left femoral neck ([facility] David Beckman   Patient fell at nursing home and sustained above injury   Date of Procedure: 2/7/2021  Procedure(s):  HIP HEMIARTHROPLASTY   Surgeon(s): Shaneka Parker DO    Patient Active Problem List   Diagnosis    Closed fracture of sixth cervical vertebra without spinal cord injury ([facility]    Weakness    Goals of care, counseling/discussion    DNR (do not resuscitate) discussion    Palliative care by specialist    Displaced fracture of left femoral neck ([facility]        Tentative placement recommendation: Subacute rehab    Equipment recommendation:  To be determined      Prior Level of Function: Patient ambulated with wheeled walker  Vs no device  Rehab Potential: good    for baseline    Past medical history:   Past Medical History:   Diagnosis Date    Cerebral aneurysm     Fracture of one rib, right side, subsequent encounter for fracture with routine healing     Hyperlipidemia     Hypertension     Left bundle branch block     Nondisplaced fracture of shaft of right clavicle     Thyroid disease      Past Surgical History:   Procedure Laterality Date    HYSTERECTOMY         Precautions: Up as tolerated, falls, alarm and  full weight bearing left  , sling????    SUBJECTIVE:    Social history: Patient lives alone however has been at rehab at [address] [place] [address] d/t Closed fracture of sixth cervical vertebra without spinal cord injury,   Fall and confusion and right shoulder pain Xrays: Acute, nondisplaced fractures of the C6 and C7 transverse processes on the right and with additional fracture of the right 1st rib and right clavicle; Lucinda Krishna is a suggestion of a nondisplaced fracture of the right humeral head. January 2021       AM-PAC Basic Mobility        AM-[facility] Mobility Inpatient   How much difficulty turning over in bed?: A Lot  How much difficulty sitting down on / standing up from a chair with arms?: A Lot  How much difficulty moving from lying on back to sitting on side of bed?: A Lot  How much help from another person moving to and from a bed to a chair?: A Lot  How much help from another person needed to walk in hospital room?: Total  How much help from another person for climbing 3-5 steps with a railing?: Total  AM-PAC Inpatient Mobility Raw Score : 10  AM-PAC Inpatient T-Scale Score : 32.29  Mobility Inpatient CMS 0-100% Score: 76.75  Mobility Inpatient CMS G-Code Modifier : CL    Nursing cleared patient for PT evaluation. The admitting diagnosis and active problem list as listed above have been reviewed prior to the initiation of this evaluation. OBJECTIVE;   Initial Evaluation  Date: 2/7/2021 Treatment Date:     Short Term/ Long Term   Goals   Was pt agreeable to Eval/treatment? Yes    To be met in 2 days   Pain level   6/10        Bed Mobility    Rolling: Maximal assist of 1    Supine to sit: Maximal assist of 1    Sit to supine: Maximal assist of 1    Scooting: Maximal assist of 1    Rolling: Minimal assist of 1    Supine to sit: Minimal assist of 1    Sit to supine: Minimal assist of 1    Scooting: Minimal assist of 1     Transfers Sit to stand:  Moderate assist of  2    Sit to stand: Minimal assist of 1     Ambulation    3-4  side steps using  hand held assist with Moderate assist of 1   for balance and st shift   20 feet using  wheeled walker with Minimal assist of 1    ROM Within functional limits with exception of left hip 75 deg    Increase range of motion 10% of affected joints    Strength BUE:  3+/5  RLE:  3+/5  LLE:  2/5   Increase strength in affected mm groups by 1/3 grade   Balance Sitting EOB:  fair  minus  Dynamic Standing:  poor Sitting EOB:  good    Dynamic Standing: fair with wheeled walker      Patient is Alert & Oriented x person and follows one step directions anxious re: pain and \"kindness\"  Sensation:  Patient  denies numbness and tingling     Edema:  yes left lower extremity    Endurance: poor      Patient education  Patient educated on role of Physical Therapy, risks of immobility, safety and plan of care, energy conservation,  importance of mobility while in hospital , ankle pumps, quad set and glut set for edema control, blood clot prevention, weight bearing status  and positioning for skin integrity and comfort      Patient response to education:   Pt verbalized understanding Pt demonstrated skill Pt requires further education in this area   Yes Partial Yes      Treatment:  Patient practiced and was instructed/facilitated in the following treatment: Patient   Sat edge of bed 10 minutes with Minimal assist of 1 to increase dynamic sitting balance and activity tolerance. seated and standing challenges for balance and endurance     Therapeutic Exercises:  ankle pumps, heel slide and hip abduction/adduction  x 10 reps. A/a    At end of session, patient in bed with alarm call light and phone within reach,   all lines and tubes intact, nursing notified. Patient would benefit from continued skilled Physical Therapy to improve functional independence and quality of life. Patient's/ family goals   none stated        ASSESSMENT: Patient exhibits decreased strength, balance, coordination impairing functional mobility. And are barriers to d/c and require skilled intervention during hospital stay. Also pt will requires skilled therapy post acute care to regain independent lost with fall.   Skilled intervention of 2 clinicians required to facilitate participation, promote active engagement to progress towards goals and prevent injury of patient and staff       Plan of Care:     -Bed Mobility: Lower extremity exercises -Sitting Balance: Incorporate reaching activities to activate trunk muscles  and Hands on support to maintain midline   -Transfers: Provide instruction on proper hand and foot position for adequate transfer of weight onto lower extremities and use of gait device, Cues for hand placement, technique and safety, Facilitate weight shift forward on to lower extremities and provide necessary stabilization of bilateral lower extremities  and Provide stabilization to prevent fall   -Gait: Gait training, Standing activities to improve: base of support, weight shift, weight bearing  and Exercises to improve hip and knee control   -Endurance: Utilize Supervised activities to increase level of endurance to allow for safe functional mobility including transfers and gait     Patient and or family understand(s) diagnosis, prognosis, and plan of care. Frequency of treatments: Patient will be seen  daily. Time in  355  Time out  415    Total Treatment Time  10 minutes    Evaluation time includes thorough review of current medical information, gathering information on past medical history/social history and prior level of function, completion of standardized testing/informal observation of tasks, assessment of data, and development of Plan of care and goals.      CPT codes:  Low Complexity PT evaluation (85116)  Neuromuscular reeducation (61338)   10 minutes  1 unit(s)    Kayla Monteiro, PT

## 2021-02-07 NOTE — ANESTHESIA PRE PROCEDURE
Department of Anesthesiology  Preprocedure Note       Name:  Amanuel Aaron   Age:  80 y.o.  :  3/31/1929                                          MRN:  [id number]         Date:  2021      Surgeon: Marty Leonard): Keyla Palomares DO    Procedure: Procedure(s):  HIP HEMIARTHROPLASTY    Medications prior to admission:   Prior to Admission medications    Medication Sig Start Date End Date Taking? Authorizing Provider   levothyroxine (SYNTHROID) 88 MCG tablet Take 1 tablet by mouth Daily 21  Yes Kulwinder Cnotreras MD   metoprolol succinate (TOPROL XL) 25 MG extended release tablet Take 25 mg by mouth daily   Yes Historical Provider, MD   simvastatin (ZOCOR) 40 MG tablet Take 40 mg by mouth nightly   Yes Historical Provider, MD       Current medications:    Current Facility-Administered Medications   Medication Dose Route Frequency Provider Last Rate Last Admin    atorvastatin (LIPITOR) tablet 40 mg  40 mg Oral Nightly Jami Mccormick MD   40 mg at 21    metoprolol tartrate (LOPRESSOR) tablet 25 mg  25 mg Oral Daily Jami Mccormick MD   25 mg at 21 0736    levothyroxine (SYNTHROID) tablet 88 mcg  88 mcg Oral Daily Jami Mccormick MD   88 mcg at 21 0538    ondansetron (ZOFRAN) injection 4 mg  4 mg Intravenous Q6H PRN Jami Mccormick MD        acetaminophen (TYLENOL) tablet 650 mg  650 mg Oral Q4H PRN Jami Mccormick MD        calcium carbonate (TUMS) chewable tablet 500 mg  500 mg Oral TID PRN Jami Mccormick MD        morphine (PF) injection 1 mg  1 mg Intravenous Q4H PRN Jami Mccormick MD        Or    morphine (PF) injection 2 mg  2 mg Intravenous Q4H PRN Jami Mccormick MD   2 mg at 21 0406       Allergies:     Allergies   Allergen Reactions    Pcn [Penicillins]        Problem List:    Patient Active Problem List   Diagnosis Code    Closed fracture of sixth cervical vertebra without spinal cord injury ([facility] S12.500A    Weakness R53.1 GLUCOSE 125 02/06/2021    PROT 6.9 02/06/2021    CALCIUM 9.4 02/06/2021    BILITOT 0.4 02/06/2021    ALKPHOS 115 02/06/2021    AST 20 02/06/2021    ALT 14 02/06/2021       POC Tests: No results for input(s): POCGLU, POCNA, POCK, POCCL, POCBUN, POCHEMO, POCHCT in the last 72 hours. Coags:   Lab Results   Component Value Date    PROTIME 12.1 02/05/2021    INR 1.1 02/05/2021    APTT 22.6 02/05/2021       HCG (If Applicable): No results found for: PREGTESTUR, PREGSERUM, HCG, HCGQUANT     ABGs: No results found for: PHART, PO2ART, IDW8ZXO, ZRA0ZIK, BEART, A3IWGNOJ     Type & Screen (If Applicable):  No results found for: LABABO, LABRH    Drug/Infectious Status (If Applicable):  No results found for: HIV, HEPCAB    COVID-19 Screening (If Applicable):   Lab Results   Component Value Date    COVID19 Not Detected 01/06/2021         Anesthesia Evaluation  Patient summary reviewed and Nursing notes reviewed no history of anesthetic complications:   Airway: Mallampati: II  TM distance: >3 FB   Neck ROM: full  Mouth opening: > = 3 FB Dental:          Pulmonary:Negative Pulmonary ROS and normal exam  breath sounds clear to auscultation                             Cardiovascular:    (+) hypertension:, hyperlipidemia      ECG reviewed  Rhythm: regular  Rate: normal           Beta Blocker:  Dose within 24 Hrs      ROS comment: Left bundle branch block     Neuro/Psych:   (+) psychiatric history (dementia):             ROS comment: Cerebral aneurysm GI/Hepatic/Renal: Neg GI/Hepatic/Renal ROS            Endo/Other:    (+) hypothyroidism::., .                  ROS comment: LEFT FEMORAL FRACTURE Abdominal:       Abdomen: soft. Vascular: negative vascular ROS. Anesthesia Plan      spinal and MAC     ASA 3     (Spinal with GA as back up )  Induction: intravenous. MIPS: Prophylactic antiemetics administered. Anesthetic plan and risks discussed with patient. Plan discussed with attending. Marcel Richardson, APRN - CRNA   2/7/2021

## 2021-02-08 LAB
HCT VFR BLD CALC: 29.5 % (ref 34–48)
HEMOGLOBIN: 9.4 G/DL (ref 11.5–15.5)
MCH RBC QN AUTO: 30.5 PG (ref 26–35)
MCHC RBC AUTO-ENTMCNC: 31.9 % (ref 32–34.5)
MCV RBC AUTO: 95.8 FL (ref 80–99.9)
PDW BLD-RTO: 12.6 FL (ref 11.5–15)
PLATELET # BLD: 184 E9/L (ref 130–450)
PMV BLD AUTO: 10.4 FL (ref 7–12)
RBC # BLD: 3.08 E12/L (ref 3.5–5.5)
WBC # BLD: 7.6 E9/L (ref 4.5–11.5)

## 2021-02-08 PROCEDURE — 97110 THERAPEUTIC EXERCISES: CPT | Performed by: PHYSICAL THERAPIST

## 2021-02-08 PROCEDURE — 36415 COLL VENOUS BLD VENIPUNCTURE: CPT

## 2021-02-08 PROCEDURE — 6360000002 HC RX W HCPCS

## 2021-02-08 PROCEDURE — 97116 GAIT TRAINING THERAPY: CPT | Performed by: PHYSICAL THERAPIST

## 2021-02-08 PROCEDURE — 6370000000 HC RX 637 (ALT 250 FOR IP): Performed by: STUDENT IN AN ORGANIZED HEALTH CARE EDUCATION/TRAINING PROGRAM

## 2021-02-08 PROCEDURE — 97530 THERAPEUTIC ACTIVITIES: CPT

## 2021-02-08 PROCEDURE — 1200000000 HC SEMI PRIVATE

## 2021-02-08 PROCEDURE — 2580000003 HC RX 258: Performed by: STUDENT IN AN ORGANIZED HEALTH CARE EDUCATION/TRAINING PROGRAM

## 2021-02-08 PROCEDURE — 97165 OT EVAL LOW COMPLEX 30 MIN: CPT

## 2021-02-08 PROCEDURE — 85027 COMPLETE CBC AUTOMATED: CPT

## 2021-02-08 RX ORDER — CEFAZOLIN SODIUM 2 G/50ML
SOLUTION INTRAVENOUS
Status: COMPLETED
Start: 2021-02-08 | End: 2021-02-08

## 2021-02-08 RX ADMIN — ASPIRIN 325 MG: 325 TABLET, COATED ORAL at 09:00

## 2021-02-08 RX ADMIN — LEVOTHYROXINE SODIUM 88 MCG: 0.09 TABLET ORAL at 07:27

## 2021-02-08 RX ADMIN — HYDROCODONE BITARTRATE AND ACETAMINOPHEN 1 TABLET: 5; 325 TABLET ORAL at 15:54

## 2021-02-08 RX ADMIN — ASPIRIN 325 MG: 325 TABLET, COATED ORAL at 20:32

## 2021-02-08 RX ADMIN — DESMOPRESSIN ACETATE 40 MG: 0.2 TABLET ORAL at 20:32

## 2021-02-08 RX ADMIN — Medication 10 ML: at 09:00

## 2021-02-08 RX ADMIN — CEFAZOLIN SODIUM 2000 MG: 2 SOLUTION INTRAVENOUS at 00:54

## 2021-02-08 RX ADMIN — Medication 10 ML: at 20:31

## 2021-02-08 RX ADMIN — METOPROLOL TARTRATE 25 MG: 25 TABLET, FILM COATED ORAL at 09:00

## 2021-02-08 ASSESSMENT — PAIN DESCRIPTION - FREQUENCY: FREQUENCY: INTERMITTENT

## 2021-02-08 ASSESSMENT — PAIN DESCRIPTION - ORIENTATION: ORIENTATION: LEFT

## 2021-02-08 ASSESSMENT — PAIN SCALES - GENERAL
PAINLEVEL_OUTOF10: 0
PAINLEVEL_OUTOF10: 2

## 2021-02-08 ASSESSMENT — PAIN - FUNCTIONAL ASSESSMENT: PAIN_FUNCTIONAL_ASSESSMENT: ACTIVITIES ARE NOT PREVENTED

## 2021-02-08 NOTE — PROGRESS NOTES
Physical Therapy    Physical Therapy Treatment Note    Room #:  1017/6166-06  Patient Name: Tamara Jimenez  YOB: 1929  MRN: [id number]    Referring Provider:    Carmen Diggs DO    Date of Service: 2/8/2021    Evaluating Physical Therapist: Feng Morales, PT  #[id number]       Diagnosis:   Displaced fracture of left femoral neck ([facility] [address] Nettie Khoury   Patient fell at nursing home and sustained above injury   Date of Procedure: 2/7/2021  Procedure(s):  HIP HEMIARTHROPLASTY   Surgeon(s): Kalie Gomez DO    Patient Active Problem List   Diagnosis    Closed fracture of sixth cervical vertebra without spinal cord injury ([facility]    Weakness    Goals of care, counseling/discussion    DNR (do not resuscitate) discussion    Palliative care by specialist    Displaced fracture of left femoral neck ([facility]        Tentative placement recommendation: Subacute rehab    Equipment recommendation:  To be determined      Prior Level of Function: Patient ambulated with wheeled walker  Vs no device  Rehab Potential: good    for baseline    Past medical history:   Past Medical History:   Diagnosis Date    Cerebral aneurysm     Fracture of one rib, right side, subsequent encounter for fracture with routine healing     Hyperlipidemia     Hypertension     Left bundle branch block     Nondisplaced fracture of shaft of right clavicle     Thyroid disease      Past Surgical History:   Procedure Laterality Date    HYSTERECTOMY         Precautions: Up as tolerated, falls, alarm and  full weight bearing left      SUBJECTIVE:    Social history: Patient lives alone however has been at rehab at [address] [place] [address] d/t Closed fracture of sixth cervical vertebra without spinal cord injury,   Fall and confusion and right shoulder pain Xrays: Acute, nondisplaced fractures of the C6 and C7 transverse processes on the right and with additional fracture of the right 1st rib and right clavicle; Twan Chapman is a suggestion of a of left hip 75 deg    Increase range of motion 10% of affected joints    Strength BUE:  3+/5  RLE:  3+/5  LLE:  2/5   Increase strength in affected mm groups by 1/3 grade   Balance Sitting EOB:  fair  minus  Dynamic Standing:  poor   Sitting EOB: fair    Dynamic Standing: poor plus with wheeled walker despite poor wt bearing through Left lower extremity   Sitting EOB:  good    Dynamic Standing: fair with wheeled walker      Patient is Alert & Oriented x person and follows one step directions anxious re: pain and \"kindness\"  Sensation:  Patient  denies numbness and tingling     Edema:  yes left lower extremity    Endurance: poor      Patient education  Patient educated on role of Physical Therapy, risks of immobility, safety and plan of care, energy conservation,  importance of mobility while in hospital , ankle pumps, quad set and glut set for edema control, blood clot prevention, weight bearing status  and positioning for skin integrity and comfort      Patient response to education:   Pt verbalized understanding Pt demonstrated skill Pt requires further education in this area   Yes Partial Yes      Treatment:  Patient practiced and was instructed/facilitated in the following treatment: Patient   Sat edge of bed 15 minutes with Supervision  to increase dynamic sitting balance and activity tolerance. seated and standing challenges for balance and endurance; seated exersices     Therapeutic Exercises:  ankle pumps and long arc quad shoulder and elbow flexion 2x  10 reps. A/a    At end of session, patient in chair with alarm call light and phone within reach,   all lines and tubes intact, nursing notified. Patient would benefit from continued skilled Physical Therapy to improve functional independence and quality of life. Patient's/ family goals   none stated        ASSESSMENT: Patient exhibits decreased strength, balance, coordination impairing functional mobility.  And are barriers to d/c and require skilled intervention during hospital stay. Also pt will requires skilled therapy post acute care to regain independent lost with fall. Improved seated balance and ability to follow commands; continues to require  skilled intervention of 2 clinicians required to facilitate participation, promote active engagement to progress towards goals and prevent injury of patient and staff    Plan of Care:     -Bed Mobility: Lower extremity exercises   -Sitting Balance: Incorporate reaching activities to activate trunk muscles  and Hands on support to maintain midline   -Transfers: Provide instruction on proper hand and foot position for adequate transfer of weight onto lower extremities and use of gait device, Cues for hand placement, technique and safety, Facilitate weight shift forward on to lower extremities and provide necessary stabilization of bilateral lower extremities  and Provide stabilization to prevent fall   -Gait: Gait training, Standing activities to improve: base of support, weight shift, weight bearing  and Exercises to improve hip and knee control   -Endurance: Utilize Supervised activities to increase level of endurance to allow for safe functional mobility including transfers and gait     Patient and or family understand(s) diagnosis, prognosis, and plan of care. Frequency of treatments: Patient will be seen  daily.        Time in   1014  Time out   1030    Total Treatment Time  16 minutes       CPT codes:    Gait Training (93331) 14 minutes 1 unit(s)    Elena Manzanares, PT

## 2021-02-08 NOTE — PROGRESS NOTES
Occupational Therapy  OCCUPATIONAL THERAPY INITIAL EVALUATION      Date:2021  Patient Name: Opal Gilbert  MRN: [id number]  : 3/31/1929  Room: [address] [place] [address]    Referring Provider: Ramesh Kent DO    Evaluating OT: Monet Schmidt OTR/L #[id number]    AM-PAC Daily Activity Raw Score:     Recommended Placement: Subacute Rehab  Recommended Adaptive Equipment: TBD     Diagnosis:   1. Closed fracture of neck of left femur, initial encounter [facility])        Surgery: 2021 -  HIP HEMIARTHROPLASTY    Pertinent Medical History:   Past Medical History:   Diagnosis Date    Cerebral aneurysm     Fracture of one rib, right side, subsequent encounter for fracture with routine healing     Hyperlipidemia     Hypertension     Left bundle branch block     Nondisplaced fracture of shaft of right clavicle     Thyroid disease       Precautions:  Falls, alarm, WBAT LLE, poor historian     Home Living: Pt from nursing home for rehab    Prior Level of Function: Assist with ADLs , Assist with IADLs; ambulated ? Pain Level: No c/o pain  Cognition: A&O: 2/4, person, place; Follows 1-2 step directions   Memory:  fair   Sequencing:  fair   Problem solving:  fair   Judgement/safety:  fair     Functional Assessment:   Initial Eval Status  Date: 21 Treatment Status  Date: STGs = LTGs  Time frame: 5-7 days   Feeding Independent        Grooming Minimal Assist     Independent    UB Dressing Minimal Assist     Independent    LB Dressing Maximal Assist     Minimal Assist   Bathing Maximal Assist    Minimal Assist   Toileting Moderate Assist     Independent    Bed Mobility  Supine to sit: Moderate Assist   Sit to supine: NT     Supine to sit:  Independent   Sit to supine: Independent    Functional Transfers Moderate Assist of 2    Minimal Assist    Functional Mobility Minimal Assist of 2  Using WW, to bed to chair  Cuing on hand placement, gait sequence, body mechanics and safety    Stand by Assist    Balance Sitting:     Static: Fair+    Dynamic:fair+  Standing: fair  Sitting:     Static:  good    Dynamic:good  Standing: fair+   Activity Tolerance Fair  Fair+   Visual/  Perceptual Glasses: No   WFL       Hand Dominance Right     Strength ROM Additional Info:    RUE  4-/5  WFL good  and wfl FMC/dexterity noted during ADL tasks       LUE 4-/5  WFL good  and wfl FMC/dexterity noted during ADL tasks       Hearing: WFL   Sensation:  No c/o numbness or tingling   Tone: WFL   Edema: None noted    Comments:   Nursing approved therapy session. Upon arrival, patient supine in bed and agreeable to OT session. Therapist educated pt on role of OT. At end of session, patient up in chair with call light and phone within reach, alarm on, all lines and tubes intact; nursing aware. Pt required reorientation throughout session. Pt demonstrated fair understanding of education/techniques and decreased independence and safety during completion of ADL/functional transfer/mobility tasks. Pt would benefit from continued skilled OT to increase safety and independence with completion of ADL/IADL tasks for functional independence and quality of life. Treatment:   Skilled occupational therapy services provided include instruction/training on safety and adapted techniques for completion of therapeutic activities, and neuromuscular reeducation. Skilled monitoring of O2 sats, HR, and pt response throughout treatment. Prior to and at the end of session, environmental modifications  completed for patients safety and efficiency of treatment session.  Therapist facilitated therapeutic activities: bed mobility, functional transfers, graded functional activities (static/dynamic sitting, static/dynamic standing, functional reaching), and functional ambulation - providing min cuing (verbal, visual, tactile) on AD management, hand placement, body mechanics, posture, breathing techniques, energy conservation, compensatory strategies, and safety.     Therapist facilitated neuromuscular reeducation to facilitate balance/righting reactions for increased function with ADLs tasks/facilitation of UE movement and proper movement patterns; and completion of therapeutic activities to facilitate fine motor function for completion of ADL tasks.     Eval Complexity: Low    Assessment of current deficits   Functional mobility [x]  ADLs [x] Strength [x]  Cognition []  Functional transfers  [x] IADLs [x] Safety Awareness [x]  Endurance [x]  Fine Motor Coordination [] Balance [x] Vision/perception [] Sensation []   Gross Motor Coordination [] ROM [] Delirium []                  Motor Control []    Plan of Care: 1-3 days/week for 1-2 weeks PRN   Instruction/training on adapted ADL techniques and AE recommendations to increase functional independence within precautions  Training on energy conservation strategies/techniques to improve independence/tolerance for self-care routine  Functional transfer/mobility training/DME recommendations for increased independence, safety, and fall prevention  Patient/Family education to increase follow through with safety techniques and functional independence  Recommendation of environmental modifications for increased safety with functional transfers/mobility and ADLs  Splinting/positioning for increased function, prevention of contractures, and improve skin integrity  Therapeutic exercise to improve motor endurance, ROM, and functional strength for ADLs/functional transfers  Therapeutic activities to facilitate/challenge dynamic balance, stand tolerance, fine motor dexterity/in-hand manipulation for increased independence with ADLs  Neuro-muscular re-education: facilitation of righting/equilibrium reactions, midline orientation, scapular stability/mobility, normalization of muscle tone, and facilitation of volitional active controled movement    Rehab Potential: Good for established goals     Patient / Family Goal: Not reported      Patient and/or family were instructed on functional diagnosis, prognosis/goals and OT plan of care. Demonstrated fair understanding. Eval Complexity: Low      Time In: 1000  Time Out: 1024  Total Treatment Time: 15    Min Units   OT Eval Low 97165  X  1   OT Eval Medium 59870      OT Eval High 62643       OT Re-Eval [id number]       Therapeutic Ex 87139       Therapeutic Activities 75840  10 1    ADL/Self Care 15064       Orthotic Management 29058       Neuro Re-Ed 16686  5     Non-Billable Time          Evaluation Time includes thorough review of current medical information, gathering information on past medical history/social history and prior level of function, completion of standardized testing/informal observation of tasks, assessment of data and education on plan of care and goals.     Jame Dinero, OTR/L #[id number]

## 2021-02-08 NOTE — PROGRESS NOTES
Department of Orthopedic Surgery  Resident Progress Note    Patient seen and examined. Pain controlled. No new complaints. Denies chest pain, shortness of breath, dizziness/lightheadedness. No acute overnight events.      VITALS:  /62   Pulse 82   Temp 98.3 °F (36.8 °C) (Oral)   Resp 19   Ht 5' 5\" (1.651 m)   Wt 112 lb 3.2 oz (50.9 kg)   SpO2 93%   BMI 18.67 kg/m²     General: awake, alert, cooperative    MUSCULOSKELETAL:   left lower extremity:  · Dressing C/D/I  · Compartments soft and compressible  · +PF/DF/EHL  · +2/4 DP & PT pulses, Brisk Cap refill, Toes warm and perfused  · Distal sensation grossly intact to Peroneals, Sural, Saphenous, and tibial nrs    CBC:   Lab Results   Component Value Date    WBC 7.6 02/08/2021    HGB 9.4 02/08/2021    HCT 29.5 02/08/2021     02/08/2021     PT/INR:    Lab Results   Component Value Date    PROTIME 12.1 02/05/2021    INR 1.1 02/05/2021           ASSESSMENT  · S/P Left donaldo hip arthroplasty - 2/7/2021    PLAN      · Continue physical therapy and protocol: WBAT - LLE  · 24 hour abx coverage  · Deep venous thrombosis prophylaxis - aspirin, early mobilization  · PT/OT  · Pain Control: IV and PO  · Monitor H&H 9.4  · D/C Plan:  Per SW/PT/OT recs

## 2021-02-08 NOTE — PROGRESS NOTES
Physical Therapy    Physical Therapy Treatment Note    Room #:  5583/6420-12  Patient Name: Gautam Mendez  YOB: 1929  MRN: [id number]    Referring Provider:    Jimbo Hilario DO    Date of Service: 2/8/2021    Evaluating Physical Therapist: Lilibeth Smith, PT  #[id number]       Diagnosis:   Displaced fracture of left femoral neck ([facility] [address] Clarisse Mederos   Patient fell at nursing home and sustained above injury   Date of Procedure: 2/7/2021  Procedure(s):  HIP HEMIARTHROPLASTY   Surgeon(s): Abena Concepcion DO    Patient Active Problem List   Diagnosis    Closed fracture of sixth cervical vertebra without spinal cord injury ([facility] [address]    Weakness    Goals of care, counseling/discussion    DNR (do not resuscitate) discussion    Palliative care by specialist    Displaced fracture of left femoral neck ([facility] [address]        Tentative placement recommendation: Subacute rehab    Equipment recommendation:  To be determined      Prior Level of Function: Patient ambulated with wheeled walker  Vs no device  Rehab Potential: good    for baseline    Past medical history:   Past Medical History:   Diagnosis Date    Cerebral aneurysm     Fracture of one rib, right side, subsequent encounter for fracture with routine healing     Hyperlipidemia     Hypertension     Left bundle branch block     Nondisplaced fracture of shaft of right clavicle     Thyroid disease      Past Surgical History:   Procedure Laterality Date    HYSTERECTOMY         Precautions: Up as tolerated, falls, alarm and  full weight bearing left      SUBJECTIVE:    Social history: Patient lives alone however has been at rehab at [address] [place] [address] d/t Closed fracture of sixth cervical vertebra without spinal cord injury,   Fall and confusion and right shoulder pain Xrays: Acute, nondisplaced fractures of the C6 and C7 transverse processes on the right and with additional fracture of the right 1st rib and right clavicle; Corey Perry is a suggestion of a nondisplaced fracture of the right humeral head. January 2021       AM-PAC Basic Mobility        AM-[facility] Mobility Inpatient   How much difficulty turning over in bed?: A Lot  How much difficulty sitting down on / standing up from a chair with arms?: A Lot  How much difficulty moving from lying on back to sitting on side of bed?: A Lot  How much help from another person moving to and from a bed to a chair?: A Lot  How much help from another person needed to walk in hospital room?: A Lot  How much help from another person for climbing 3-5 steps with a railing?: Total  AM-PAC Inpatient Mobility Raw Score : 11  AM-PAC Inpatient T-Scale Score : 33.86  Mobility Inpatient CMS 0-100% Score: 72.57  Mobility Inpatient CMS G-Code Modifier : CL    Nursing cleared patient for PT treatment. Pt recently returned to bed with nursing; agreeable to exercise    OBJECTIVE;   Initial Evaluation  Date: 2/7/2021 Treatment Date:   2/8/2021     Short Term/ Long Term   Goals   Was pt agreeable to Eval/treatment? Yes    To be met in 2 days   Pain level   6/10    5/10    Bed Mobility    Rolling: Maximal assist of 1    Supine to sit: Maximal assist of 1    Sit to supine: Maximal assist of 1    Scooting: Maximal assist of 1    see am note Rolling: Minimal assist of 1    Supine to sit: Minimal assist of 1    Sit to supine: Minimal assist of 1    Scooting: Minimal assist of 1     Transfers Sit to stand:  Moderate assist of  2    Sit to stand: Minimal assist of 1     Ambulation    3-4  side steps using  hand held assist with Moderate assist of 1   for balance and st shift   20 feet using  wheeled walker with Minimal assist of 1    ROM Within functional limits with exception of left hip 75 deg    Increase range of motion 10% of affected joints    Strength BUE:  3+/5  RLE:  3+/5  LLE:  2/5   Increase strength in affected mm groups by 1/3 grade   Balance Sitting EOB:  fair  minus  Dynamic Standing:  poor     Sitting EOB:  good    Dynamic Standing: fair with wheeled walker      Patient is Alert & Oriented x person and follows one step directions anxious re: pain and \"kindness\"  Sensation:  Patient  denies numbness and tingling     Edema:  yes left lower extremity    Endurance: poor      Patient education  Patient educated on role of Physical Therapy, risks of immobility, safety and plan of care, energy conservation,  importance of mobility while in hospital , ankle pumps, quad set and glut set for edema control, blood clot prevention, weight bearing status  and positioning for skin integrity and comfort      Patient response to education:   Pt verbalized understanding Pt demonstrated skill Pt requires further education in this area   Yes Partial Yes      Treatment:  Patient practiced and was instructed/facilitated in the following treatment: Patient      seated and standing challenges for balance and endurance; seated exersices     Therapeutic Exercises:  ankle pumps, heel slide, hip abduction/adduction and straight leg raise   2x  10 reps. A/a bilateral     At end of session, patient in bed with alarm call light and phone within reach,   all lines and tubes intact, nursing notified. Patient would benefit from continued skilled Physical Therapy to improve functional independence and quality of life. Patient's/ family goals   none stated        ASSESSMENT: Patient exhibits decreased strength, balance, coordination impairing functional mobility. And are barriers to d/c and require skilled intervention during hospital stay. Also pt will requires skilled therapy post acute care to regain independent lost with fall.         Tolerated exercises well however increased pain noted and nursing made aware    Plan of Care:     -Bed Mobility: Lower extremity exercises   -Sitting Balance: Incorporate reaching activities to activate trunk muscles  and Hands on support to maintain midline   -Transfers: Provide instruction on proper hand and foot position for adequate transfer of weight onto lower extremities and use of gait device, Cues for hand placement, technique and safety, Facilitate weight shift forward on to lower extremities and provide necessary stabilization of bilateral lower extremities  and Provide stabilization to prevent fall   -Gait: Gait training, Standing activities to improve: base of support, weight shift, weight bearing  and Exercises to improve hip and knee control   -Endurance: Utilize Supervised activities to increase level of endurance to allow for safe functional mobility including transfers and gait     Patient and or family understand(s) diagnosis, prognosis, and plan of care. Frequency of treatments: Patient will be seen  daily.        Time in   327  Time out   344    Total Treatment Time  27 minutes       CPT codes:    Therapeutic exercises (64636)   27 minutes  2 unit(s)    Vidhi Almodovar, PT

## 2021-02-09 VITALS
WEIGHT: 112.2 LBS | TEMPERATURE: 97.9 F | SYSTOLIC BLOOD PRESSURE: 144 MMHG | HEIGHT: 65 IN | DIASTOLIC BLOOD PRESSURE: 57 MMHG | OXYGEN SATURATION: 96 % | BODY MASS INDEX: 18.69 KG/M2 | HEART RATE: 76 BPM | RESPIRATION RATE: 24 BRPM

## 2021-02-09 PROBLEM — E44.1 MILD PROTEIN-CALORIE MALNUTRITION (HCC): Status: ACTIVE | Noted: 2021-02-09

## 2021-02-09 LAB
ALBUMIN SERPL-MCNC: 2.5 G/DL (ref 3.5–5.2)
ALP BLD-CCNC: 85 U/L (ref 35–104)
ALT SERPL-CCNC: 9 U/L (ref 0–32)
ANION GAP SERPL CALCULATED.3IONS-SCNC: 8 MMOL/L (ref 7–16)
AST SERPL-CCNC: 32 U/L (ref 0–31)
BASOPHILS ABSOLUTE: 0.05 E9/L (ref 0–0.2)
BASOPHILS RELATIVE PERCENT: 0.7 % (ref 0–2)
BILIRUB SERPL-MCNC: 0.4 MG/DL (ref 0–1.2)
BUN BLDV-MCNC: 27 MG/DL (ref 8–23)
CALCIUM SERPL-MCNC: 8.3 MG/DL (ref 8.6–10.2)
CHLORIDE BLD-SCNC: 106 MMOL/L (ref 98–107)
CO2: 20 MMOL/L (ref 22–29)
CREAT SERPL-MCNC: 0.9 MG/DL (ref 0.5–1)
EOSINOPHILS ABSOLUTE: 0.22 E9/L (ref 0.05–0.5)
EOSINOPHILS RELATIVE PERCENT: 3.1 % (ref 0–6)
GFR AFRICAN AMERICAN: >60
GFR NON-AFRICAN AMERICAN: 59 ML/MIN/1.73
GLUCOSE BLD-MCNC: 91 MG/DL (ref 74–99)
HCT VFR BLD CALC: 27.7 % (ref 34–48)
HEMOGLOBIN: 9 G/DL (ref 11.5–15.5)
IMMATURE GRANULOCYTES #: 0.03 E9/L
IMMATURE GRANULOCYTES %: 0.4 % (ref 0–5)
LYMPHOCYTES ABSOLUTE: 1.55 E9/L (ref 1.5–4)
LYMPHOCYTES RELATIVE PERCENT: 22 % (ref 20–42)
MCH RBC QN AUTO: 30.5 PG (ref 26–35)
MCHC RBC AUTO-ENTMCNC: 32.5 % (ref 32–34.5)
MCV RBC AUTO: 93.9 FL (ref 80–99.9)
MONOCYTES ABSOLUTE: 0.67 E9/L (ref 0.1–0.95)
MONOCYTES RELATIVE PERCENT: 9.5 % (ref 2–12)
NEUTROPHILS ABSOLUTE: 4.53 E9/L (ref 1.8–7.3)
NEUTROPHILS RELATIVE PERCENT: 64.3 % (ref 43–80)
PDW BLD-RTO: 12.4 FL (ref 11.5–15)
PLATELET # BLD: 195 E9/L (ref 130–450)
PMV BLD AUTO: 10.3 FL (ref 7–12)
POTASSIUM SERPL-SCNC: 4.2 MMOL/L (ref 3.5–5)
RBC # BLD: 2.95 E12/L (ref 3.5–5.5)
SODIUM BLD-SCNC: 134 MMOL/L (ref 132–146)
TOTAL PROTEIN: 5.5 G/DL (ref 6.4–8.3)
WBC # BLD: 7.1 E9/L (ref 4.5–11.5)

## 2021-02-09 PROCEDURE — 2580000003 HC RX 258: Performed by: STUDENT IN AN ORGANIZED HEALTH CARE EDUCATION/TRAINING PROGRAM

## 2021-02-09 PROCEDURE — 6370000000 HC RX 637 (ALT 250 FOR IP): Performed by: STUDENT IN AN ORGANIZED HEALTH CARE EDUCATION/TRAINING PROGRAM

## 2021-02-09 PROCEDURE — 97530 THERAPEUTIC ACTIVITIES: CPT

## 2021-02-09 PROCEDURE — 85025 COMPLETE CBC W/AUTO DIFF WBC: CPT

## 2021-02-09 PROCEDURE — 97110 THERAPEUTIC EXERCISES: CPT

## 2021-02-09 PROCEDURE — 80053 COMPREHEN METABOLIC PANEL: CPT

## 2021-02-09 PROCEDURE — 36415 COLL VENOUS BLD VENIPUNCTURE: CPT

## 2021-02-09 RX ADMIN — METOPROLOL TARTRATE 25 MG: 25 TABLET, FILM COATED ORAL at 10:57

## 2021-02-09 RX ADMIN — Medication 10 ML: at 10:57

## 2021-02-09 RX ADMIN — ASPIRIN 325 MG: 325 TABLET, COATED ORAL at 09:30

## 2021-02-09 RX ADMIN — LEVOTHYROXINE SODIUM 88 MCG: 0.09 TABLET ORAL at 05:31

## 2021-02-09 RX ADMIN — ACETAMINOPHEN 650 MG: 325 TABLET, FILM COATED ORAL at 13:34

## 2021-02-09 NOTE — PLAN OF CARE
Problem: Falls - Risk of:  Goal: Will remain free from falls  Outcome: Met This Shift     Problem: Falls - Risk of:  Goal: Absence of physical injury  Outcome: Met This Shift     Problem: Skin Integrity:  Goal: Will show no infection signs and symptoms  Outcome: Met This Shift     Problem: Skin Integrity:  Goal: Absence of new skin breakdown  Outcome: Met This Shift     Problem: Activity:  Goal: Ability to ambulate will improve  Outcome: Met This Shift     Problem:  Activity:  Goal: Ability to perform activities at highest level will improve  Outcome: Met This Shift     Problem: Pain:  Goal: Pain level will decrease  Outcome: Met This Shift     Problem: Pain:  Goal: Control of acute pain  Outcome: Met This Shift     Problem: Pain:  Goal: Control of chronic pain  Outcome: Met This Shift

## 2021-02-09 NOTE — PROGRESS NOTES
OT BEDSIDE TREATMENT NOTE      Date:2021  Patient Name: Ari Ernandez  MRN: [id number]  : 3/31/1929  Room: [address] [place] [address]     Referring Provider: Ramon Castillo DO     Evaluating OT: Ayo Ziegler OTR/HEATHER #[id number]     AM-PAC Daily Activity Raw Score: 15/24     Recommended Placement: Subacute Rehab  Recommended Adaptive Equipment: TBD      Diagnosis:   1. Closed fracture of neck of left femur, initial encounter [facility])          Surgery: 2021 -  HIP HEMIARTHROPLASTY     Pertinent Medical History:   Past Medical History        Past Medical History:   Diagnosis Date    Cerebral aneurysm      Fracture of one rib, right side, subsequent encounter for fracture with routine healing      Hyperlipidemia      Hypertension      Left bundle branch block      Nondisplaced fracture of shaft of right clavicle      Thyroid disease           Precautions:  Falls, alarm, WBAT LLE, poor historian     Home Living: Pt from nursing home for rehab     Prior Level of Function: Assist with ADLs , Assist with IADLs; ambulated ?     Pain Level: No c/o pain  Cognition: A&O: 2/4, person, place;  Follows 1-2 step directions              Memory:  fair              Sequencing:  fair              Problem solving:  fair              Judgement/safety:  fair                Functional Assessment:    Initial Eval Status  Date: 21 Treatment Status  Date:2021 STGs = LTGs  Time frame: 5-7 days   Feeding Independent      N/T     Grooming Minimal Assist    N/T Independent    UB Dressing Minimal Assist     Min A to tie back of gown Independent    LB Dressing Maximal Assist      Max A to don socks Minimal Assist   Bathing Maximal Assist     N/T Minimal Assist   Toileting Moderate Assist     N/T Independent    Bed Mobility  Supine to sit: Moderate Assist   Sit to supine: NT     Mod A x 2 for supine to sit with assist to guide LE's and UB to sitting; mod A x 2 for scooting; use of chux pad as sling; sit to supine at max A x 2 with assist to guide UB and LE's to supine  Supine to sit: Independent   Sit to supine: Independent    Functional Transfers Moderate Assist of 2    Mod A x 2 for sit to stand from EOB with use of chux pad as sling and FWW Minimal Assist    Functional Mobility Minimal Assist of 2  Using [employer], to bed to chair  Cuing on hand placement, gait sequence, body mechanics and safety    Mod A x 2 with FWW to side step to [facility] with demo'ing R heel toe shuffle; intermittent side step/shuffle with L foot; cuing to maintain hip precautions/joint integrity  Stand by Assist    Balance Sitting:     Static:  Fair+    Dynamic:fair+  Standing: fair Sitting:     Static:  Fair+    Dynamic:fair  Standing: fair  Sitting:     Static:  good    Dynamic:good  Standing: fair+   Activity Tolerance Fair fair  Fair+   Visual/  Perceptual Glasses: No   WFL          Hand Dominance Right       Strength ROM Additional Info:    RUE  4-/5  WFL good  and wfl FMC/dexterity noted during ADL tasks         LUE 4-/5  WFL good  and wfl FMC/dexterity noted during ADL tasks       - MARYJO BURTON exercises: 10 reps in all planes of movement to increase ROM/endurance required for functional transfers/ADL participation. Exercises completed in shoulder and elbow flexion/extension, internal/external rotation, abduction/adduction, supination/pronation, digit and wrist flexion/extension, abduction/adduction and digit opposition. Limited ROM in R UE d/t hx of R clavicle fx; L [facility]. Min rest breaks provided 2* to decreased endurance/tolerance. Ex's completed when pt seated EOB ; assist for upright sitting balance initially. Comments: Patient cleared by nursing staff. Upon arrival pt supine in bed. Pt agreeable to OT tx session. Pt educated with regards to bed mobility, hand placement, safety awareness, static sitting balance,  standing balance, transfer training, functional mobility,  LE/UE dressing, B UE ROM ex's, ECT's.   At end of session pt supine in bed with HOB slightly elevated; All lines and tubes intact, call light within reach. Overall, pt demonstrated decreased independence and safety during completion of ADL/functional transfers/mobility tasks. Pt would benefit from continued skilled OT to increase safety and independence with completion of ADL/IADL tasks for functional independence and quality of life. Partial cotreat with PT d/t level of physical assistance needed for transfers. Pt required cues and education as noted above for safe facilitation and completion of tasks. Therapist provided skilled monitoring of patient's response during treatment session. Prior to and at the end of session, environmental modifications /line management completed for patients safety and efficiency of treatment session. Overall, patient demonstrates moderate difficulties with completion of BADLs and IADLs. Factors contributing to these difficulties include hip precautions,confusion, decreased endurance, and generalized weakness. As noted above, patient likely to benefit from further OT intervention to increase independence, safety, and overall quality of life. Treatment:     ? Bed mobility: Facilitated bed mobility with cues for proper body mechanics and sequencing to prepare for ADL completion. ? Functional transfers: Facilitated transfers from various surfaces with cues for body alignment, safety and hand placement. ? ADL completion: Self-care retraining for the above-mentioned ADLs; training on proper hand placement, safety technique, sequencing, and energy conservation techniques. ? Postural Balance: Sitting/standing balance retraining to improve righting reactions with postural changes during ADLs. ?  Therapeutic Ex's: To increase B UE strength/ROM/endurance required for functional transfers and ADL participation     · Pt has made fair/fair minus progress towards set goals    · OT 1-3x/week for 5-7 days during hospitalization       Treatment Time In: 1:20 PM    Treatment Time Out: 1:48 PM            Treatment Charges: Mins Units   ADL/Home Mgt     68575     Thera Activities     56214 10 1   Ther Ex                 65371 13 1   Manual Therapy    25261     Neuro Re-ed         35369     [facility] manage/training                               74237     Non Billable Time 5    Total Timed Treatment 28-5=23 2        Parish Klein, [address]

## 2021-02-09 NOTE — PROGRESS NOTES
Department of Internal Medicine  General Internal Medicine  Attending Progress Note      SUBJECTIVE:    Feels ok no complaints  Eating ok  Pain controlled. Confused. Medications    Current Facility-Administered Medications: sodium chloride flush 0.9 % injection 10 mL, 10 mL, Intravenous, 2 times per day  sodium chloride flush 0.9 % injection 10 mL, 10 mL, Intravenous, PRN  promethazine (PHENERGAN) tablet 12.5 mg, 12.5 mg, Oral, Q6H PRN **OR** ondansetron (ZOFRAN) injection 4 mg, 4 mg, Intravenous, Q6H PRN  aspirin EC tablet 325 mg, 325 mg, Oral, BID  HYDROcodone-acetaminophen (NORCO) 5-325 MG per tablet 1 tablet, 1 tablet, Oral, Q4H PRN **OR** HYDROcodone-acetaminophen (NORCO) 5-325 MG per tablet 2 tablet, 2 tablet, Oral, Q4H PRN  atorvastatin (LIPITOR) tablet 40 mg, 40 mg, Oral, Nightly  metoprolol tartrate (LOPRESSOR) tablet 25 mg, 25 mg, Oral, Daily  levothyroxine (SYNTHROID) tablet 88 mcg, 88 mcg, Oral, Daily  acetaminophen (TYLENOL) tablet 650 mg, 650 mg, Oral, Q4H PRN  calcium carbonate (TUMS) chewable tablet 500 mg, 500 mg, Oral, TID PRN  morphine (PF) injection 1 mg, 1 mg, Intravenous, Q4H PRN **OR** morphine (PF) injection 2 mg, 2 mg, Intravenous, Q4H PRN    Physical    VITALS:  BP (!) 134/51   Pulse 75   Temp 98.8 °F (37.1 °C) (Oral)   Resp 18   Ht 5' 5\" (1.651 m)   Wt 112 lb 3.2 oz (50.9 kg)   SpO2 97%   BMI 18.67 kg/m²   TEMPERATURE:  Current - Temp: 98.8 °F (37.1 °C);  Max - Temp  Av.7 °F (37.1 °C)  Min: 97.8 °F (36.6 °C)  Max: 99.9 °F (37.7 °C)  RESPIRATIONS RANGE: Resp  Av.3  Min: 16  Max: 20  PULSE RANGE: Pulse  Av.3  Min: 75  Max: 100  BLOOD PRESSURE RANGE:  Systolic (25AOU), KHN:230 , Min:116 , JUE:066   ; Diastolic (96VDA), DPV:80, Min:51, Max:67    PULSE OXIMETRY RANGE: SpO2  Av.4 %  Min: 92 %  Max: 99 %  24HR INTAKE/OUTPUT:      Intake/Output Summary (Last 24 hours) at 2021 1906  Last data filed at 2021 1441  Gross per 24 hour   Intake 770 ml   Output 800 ml

## 2021-02-09 NOTE — CARE COORDINATION
SS Note/Discharge plan:  Contacted [facility] at [facility] confirming skilled return for today at 4:00pm via physicians ambulance, pt's Sigrid Candelaria informed of transfer arrangements, nursing notified. Electronically signed by FLAQUITO Barnhart on 2/9/2021 at 10:24 AM

## 2021-02-09 NOTE — DISCHARGE INSTR - COC
Continuity of Care Form    Patient Name: Sharda Dominguez   :  3/31/1929  MRN:  [id number]    Admit date:  2021  Discharge date:  2021    Code Status Order: DNR-CCA   Advance Directives:   [facility] Documentation     Date/Time Healthcare Directive Type of Healthcare Directive Copy in [facility] [address] Agent's Name Healthcare Agent's Phone Number    21 [phone]  No, patient does not have an advance directive for healthcare treatment -- -- -- -- --          Admitting Physician:  Venkat Lange MD  PCP: Venkat Lange MD    Discharging Nurse: [facility] Unit/Room#: 0314/0314-02  Discharging Unit Phone Number: [phone]    Emergency Contact:   Extended Emergency Contact Information  Primary Emergency Contact: Naty Daly  [place] Phone: [phone]  Relation: Other   needed? No  Secondary Emergency Contact: [address] [place] [address] Phone: [phone]  Relation: Other   needed?  No    Past Surgical History:  Past Surgical History:   Procedure Laterality Date    HIP SURGERY Left 2021    HIP HEMIARTHROPLASTY performed by Eamon Gallegos DO at [place] [address]         Immunization History:   Immunization History   Administered Date(s) Administered    COVID-19, Gallegos [place], 30mcg/0.3ml Dose 2021, 2021       Active Problems:  Patient Active Problem List   Diagnosis Code    Closed fracture of sixth cervical vertebra without spinal cord injury ([facility] S12.500A    Weakness R53.1    Goals of care, counseling/discussion Z71.89    DNR (do not resuscitate) discussion Z70.80    Palliative care by specialist Z51.5    Displaced fracture of left femoral neck ([address] S72.002A    Mild protein-calorie malnutrition ([address] E44.1       Isolation/Infection:   Isolation          No Isolation        Patient Infection Status     Infection Onset Added Last Indicated Last Indicated By Review Planned Expiration Resolved Resolved By    None active    Resolved    COVID-19 Rule Out 01/06/21 01/06/21 01/06/21 COVID-19 (Ordered)   01/06/21 Rule-Out Test Resulted          Nurse Assessment:  Last Vital Signs: BP (!) 148/82   Pulse 86   Temp 99.4 °F (37.4 °C) (Oral)   Resp 18   Ht 5' 5\" (1.651 m)   Wt 112 lb 3.2 oz (50.9 kg)   SpO2 95%   BMI 18.67 kg/m²     Last documented pain score (0-10 scale): Pain Level: 0  Last Weight:   Wt Readings from Last 1 Encounters:   02/05/21 112 lb 3.2 oz (50.9 kg)     Mental Status:  disoriented and alert    IV Access:  - None    Nursing Mobility/ADLs:  Walking   Assisted  Transfer  Assisted  Bathing  Assisted  Dressing  Assisted  Toileting  Assisted  Feeding  Independent  Med Admin  Assisted  Med Delivery   whole    Wound Care Documentation and Therapy:        Elimination:  Continence:   · Bowel: Yes  · Bladder: {YES / AN:40675}  Urinary Catheter: None   Colostomy/Ileostomy/Ileal Conduit: No       Date of Last BM: ***    Intake/Output Summary (Last 24 hours) at 2/9/2021 0948  Last data filed at 2/9/2021 0946  Gross per 24 hour   Intake 980 ml   Output 1250 ml   Net -270 ml     I/O last 3 completed shifts: In: 1010 [P.O.:1010]  Out: 1150 [Urine:1150]    Safety Concerns:     History of Falls (last 30 days)    Impairments/Disabilities:      [address] [place] [address] Impairments/Disabilities:256671993}    Nutrition Therapy:  Current Nutrition Therapy:   - Oral Diet:  General    Routes of Feeding: Oral  Liquids: Thin Liquids  Daily Fluid Restriction: no  Last Modified Barium Swallow with Video (Video Swallowing Test): not done    Treatments at the Time of Hospital Discharge:   Respiratory Treatments: ***  Oxygen Therapy:  is not on home oxygen therapy.   Ventilator:    - No ventilator support    Rehab Therapies: Physical Therapy and Occupational Therapy  Weight Bearing Status/Restrictions: No weight bearing restirctions  Other Medical Equipment (for information only, NOT a DME order):  {EQUIPMENT:273145918}  Other Treatments: ***    Patient's personal belongings (please select all that are sent with patient):  {[facility] DME Belongings:008308506}    RN SIGNATURE:  Electronically signed by Kaylie Brooke RN on 2/9/21 at 9:51 AM EST    CASE MANAGEMENT/SOCIAL WORK SECTION    Inpatient Status Date: ***    Readmission Risk Assessment Score:  Readmission Risk              Risk of Unplanned Readmission:        14           Discharging to Facility/ Agency   · Name:   · Address:  · Phone:  · Fax:    Dialysis Facility (if applicable)   · Name:  · Address:  · Dialysis Schedule:  · Phone:  · Fax:    / signature: {Esignature:815496956}    PHYSICIAN SECTION    Prognosis: {Prognosis:2522795578}    Condition at Discharge: [address] [place] [address] Patient Condition:225323089}    Rehab Potential (if transferring to Rehab): {Prognosis:2755390681}    Recommended Labs or Other Treatments After Discharge: ***    Physician Certification: I certify the above information and transfer of Destiny Whitlock  is necessary for the continuing treatment of the diagnosis listed and that she requires {Admit to Appropriate Level of Care:83429} for {GREATER/LESS:889590085} 30 days.      Update Admission H&P: {CHP DME Changes in YXTNV:958933200}    PHYSICIAN SIGNATURE:  Electronically signed by Anjana Chand MD on 2/9/21 at 9:51 AM EDT

## 2021-02-09 NOTE — PROGRESS NOTES
Physical Therapy    Physical Therapy Treatment Note    Room #:  8990/6759-87  Patient Name: Edgardo Matias  YOB: 1929  MRN: [id number]    Referring Provider:    Rui Gillespie DO    Date of Service: 2/9/2021    Evaluating Physical Therapist: Eliana Gupta, PT  #[id number]       Diagnosis:   Displaced fracture of left femoral neck ([address] Suzie Ramírez   Patient fell at nursing home and sustained above injury   Date of Procedure: 2/7/2021  Procedure(s):  HIP HEMIARTHROPLASTY   Surgeon(s): Meli Reynolds DO    Patient Active Problem List   Diagnosis    Closed fracture of sixth cervical vertebra without spinal cord injury ([address]    Weakness    Goals of care, counseling/discussion    DNR (do not resuscitate) discussion    Palliative care by specialist    Displaced fracture of left femoral neck ([facility]    Mild protein-calorie malnutrition ([address]        Tentative placement recommendation: Subacute rehab    Equipment recommendation:  To be determined      Prior Level of Function: Patient ambulated with wheeled walker  Vs no device  Rehab Potential: good    for baseline    Past medical history:   Past Medical History:   Diagnosis Date    Cerebral aneurysm     Fracture of one rib, right side, subsequent encounter for fracture with routine healing     Hyperlipidemia     Hypertension     Left bundle branch block     Nondisplaced fracture of shaft of right clavicle     Thyroid disease      Past Surgical History:   Procedure Laterality Date    HIP SURGERY Left 2/7/2021    [facility]         Precautions: Up as tolerated, falls, alarm and  full weight bearing left      SUBJECTIVE:    Social history: Patient lives alone however has been at rehab at [address] [place] [address] d/t Closed fracture of sixth cervical vertebra without spinal cord injury,   Fall and confusion and right shoulder pain Xrays: Acute, nondisplaced fractures of the C6 and C7 bearing through Left lower extremity ,the patient able to take small steps and also perform heel /toe shuffle to move toward head of bed    20 feet using  wheeled walker with Minimal assist of 1    ROM Within functional limits with exception of left hip 75 deg    Increase range of motion 10% of affected joints    Strength BUE:  3+/5  RLE:  3+/5  LLE:  2/5   Increase strength in affected mm groups by 1/3 grade   Balance Sitting EOB:  fair  minus  Dynamic Standing:  poor   Sitting EOB: fair    Dynamic Standing: poor plus with wheeled walker    Sitting EOB:  good    Dynamic Standing: fair with wheeled walker      Patient is Alert & Oriented x person and follows one step directions anxious re: pain and \"kindness\"  Sensation:  Patient  denies numbness and tingling     Edema:  yes left lower extremity    Endurance: poor      Patient education  Patient educated on role of Physical Therapy, risks of immobility, safety and plan of care, energy conservation,  importance of mobility while in hospital , ankle pumps, quad set and glut set for edema control, blood clot prevention, weight bearing status  and positioning for skin integrity and comfort      Patient response to education:   Pt verbalized understanding Pt demonstrated skill Pt requires further education in this area   Yes Partial Yes      Treatment:  Patient practiced and was instructed/facilitated in the following treatment: Patient   Sat edge of bed 15 minutes with Minimal assist of 1 to increase dynamic sitting balance and activity tolerance. seated and standing challenges for balance and endurance; seated exersices     Therapeutic Exercises:  ankle pumps, hip abduction/adduction, straight leg raise, long arc quad and seated marching  X 15-20 reps active assisted bilateral lower extremities     At end of session, patient in bed in chair position with alarm call light and phone within reach,   all lines and tubes intact, nursing notified.       Patient would benefit from continued skilled Physical Therapy to improve functional independence and quality of life. Patient required co-treat with occupational therapy due to the level of physical assistance needed and the patients endurance level to tolerate separate sessions. Patient's/ family goals   none stated        ASSESSMENT: Patient exhibits decreased strength, balance, coordination impairing functional mobility. And are barriers to d/c and require skilled intervention during hospital stay. Also pt will requires skilled therapy post acute care to regain independent lost with fall. Improved seated balance and ability to follow commands; continues to require  skilled intervention of 2 clinicians required to facilitate participation, promote active engagement to progress towards goals and prevent injury of patient and staff  Pt with increased tolerance to activity, appeared anxious and fearful with encouragement and reassurance given throughout. Nursing assisting with reaching family/friends through phone calls.    Plan of Care:     -Bed Mobility: Lower extremity exercises   -Sitting Balance: Incorporate reaching activities to activate trunk muscles  and Hands on support to maintain midline   -Transfers: Provide instruction on proper hand and foot position for adequate transfer of weight onto lower extremities and use of gait device, Cues for hand placement, technique and safety, Facilitate weight shift forward on to lower extremities and provide necessary stabilization of bilateral lower extremities  and Provide stabilization to prevent fall   -Gait: Gait training, Standing activities to improve: base of support, weight shift, weight bearing  and Exercises to improve hip and knee control   -Endurance: Utilize Supervised activities to increase level of endurance to allow for safe functional mobility including transfers and gait     Patient and or family understand(s) diagnosis, prognosis, and plan of care.    Frequency of treatments: Patient will be seen  daily.        Time in   132  Time out   154      Total Treatment Time 22  minutes       CPT codes:    Therapeutic exercises (92710)   12 minutes  1 unit(s)  Non billable time 10 minutes    Nathalie [facility], [place] [address]

## 2021-02-09 NOTE — PROGRESS NOTES
Department of Orthopedic Surgery  Resident Progress Note    Patient seen and examined. Pain controlled. No new complaints. Denies chest pain, shortness of breath, dizziness/lightheadedness. No acute overnight events.      VITALS:  BP (!) 148/82   Pulse 86   Temp 99.4 °F (37.4 °C) (Oral)   Resp 18   Ht 5' 5\" (1.651 m)   Wt 112 lb 3.2 oz (50.9 kg)   SpO2 95%   BMI 18.67 kg/m²     General: awake, alert, cooperative    MUSCULOSKELETAL:   left lower extremity:  · Dressing C/D/I  · Compartments soft and compressible  · +PF/DF/EHL  · +2/4 DP & PT pulses, Brisk Cap refill, Toes warm and perfused  · Distal sensation grossly intact to Peroneals, Sural, Saphenous, and tibial nrs    CBC:   Lab Results   Component Value Date    WBC 7.1 02/09/2021    HGB 9.0 02/09/2021    HCT 27.7 02/09/2021     02/09/2021     PT/INR:    Lab Results   Component Value Date    PROTIME 12.1 02/05/2021    INR 1.1 02/05/2021           ASSESSMENT  · S/P Left donaldo hip arthroplasty - 2/7/2021    PLAN      · Continue physical therapy and protocol: WBAT - LLE  · 24 hour abx coverage, complete   · Deep venous thrombosis prophylaxis - aspirin, early mobilization  · PT/OT  · Pain Control: IV and PO  · Monitor H&H 9.0, asymptomatic   · D/C Plan:  Per SW/PT/OT recs, ok for dc when medically stable

## 2021-02-09 NOTE — PROGRESS NOTES
Comprehensive Nutrition Assessment    Type and Reason for Visit:  Initial, Positive Nutrition Screen    Nutrition Recommendations/Plan: Continue Current Diet, Start Oral Nutrition Supplement: Ensure Enlive TID    Nutrition Assessment:  Pt mildly malnourished AEB mild fat/muscle wasting, poor p.o. intakes. Pt admits w/ Closed Fx of Left neck of (L) Femur s/p ORIF 2/7 w/PMH Dementia, HTN, Hypothyroid, HLD> Will start ONS BID and monitor    Malnutrition Assessment:  Malnutrition Status:  Mild malnutrition    Context:  Acute Illness     Findings of the 6 clinical characteristics of malnutrition:  Energy Intake:  7 - 50% or less of estimated energy requirements for 5 or more days(since admission 2/5)  Weight Loss:  No significant weight loss     Body Fat Loss:  1 - Mild body fat loss Orbital, Triceps   Muscle Mass Loss:  1 - Mild muscle mass loss Temples (temporalis), Clavicles (pectoralis & deltoids)  Fluid Accumulation:  No significant fluid accumulation     Strength:  Not Performed    Estimated Daily Nutrient Needs:  Energy (kcal):  1200-1300kcal/d; Weight Used for Energy Requirements:  Current     Protein (g):  75-85gm pro/d(x1.5-1.7gm/kg);  Weight Used for Protein Requirements:  Current        Fluid (ml/day):  1200-1300ml/d; Method Used for Fluid Requirements:  1 ml/kcal      Nutrition Related Findings:  Pt alert w/ confusion, missing teeth, audible BS, Abd WDL, Trace LLE, I/O WNL      Wounds:  Surgical Incision       Current Nutrition Therapies:    DIET GENERAL;  Dietary Nutrition Supplements: Standard High Calorie Oral Supplement    Anthropometric Measures:  · Height: 5' 5\" (165.1 cm)  · Current Body Weight: 112 lb (50.8 kg)(2/5)   · Admission Body Weight: 112 lb (50.8 kg)(2/5 bed scale)    · Usual Body Weight: 110 lb (49.9 kg)(1/4/21 per emr review)     · Ideal Body Weight: 125 lbs; % Ideal Body Weight 89.6 %   · BMI: 18.6  · Adjusted Body Weight:  ; No Adjustment   · BMI Categories: Underweight (BMI less than 25) age over 72       Nutrition Diagnosis:   · Mild malnutrition, In context of acute illness or injury related to cognitive or neurological impairment(Dementia, Fx Femur s/p ORIF) as evidenced by intake 26-50%, BMI, mild loss of subcutaneous fat, mild muscle loss      Nutrition Interventions:   Food and/or Nutrient Delivery:  Continue Current Diet, Start Oral Nutrition Supplement  Nutrition Education/Counseling:  No recommendation at this time   Coordination of Nutrition Care:  Continue to monitor while inpatient    Goals:  Pt to consume >50-75% most meals/ONS       Nutrition Monitoring and Evaluation:   Behavioral-Environmental Outcomes:  None Identified   Food/Nutrient Intake Outcomes:  Food and Nutrient Intake, Supplement Intake  Physical Signs/Symptoms Outcomes:  Biochemical Data, Fluid Status or Edema, Hemodynamic Status, Nutrition Focused Physical Findings, Skin, Weight     Discharge Planning:     Too soon to determine     Electronically signed by Trang Merrill RD, LD on 2/9/21 at 8:26 AM EST    Contact: [phone]

## 2021-02-09 NOTE — PLAN OF CARE
Problem: Falls - Risk of:  Goal: Will remain free from falls  2/9/2021 1618 by Za Loza RN  Outcome: Completed     Problem: Falls - Risk of:  Goal: Absence of physical injury  2/9/2021 1618 by Za Loza RN  Outcome: Completed     Problem: Skin Integrity:  Goal: Will show no infection signs and symptoms  2/9/2021 1618 by Za Loza RN  Outcome: Completed     Problem: Skin Integrity:  Goal: Absence of new skin breakdown  2/9/2021 1618 by Za Loza RN  Outcome: Completed     Problem: Activity:  Goal: Ability to ambulate will improve  2/9/2021 1618 by Za Loza RN  Outcome: Completed     Problem: Activity:  Goal: Ability to perform activities at highest level will improve  2/9/2021 1618 by Za Loza RN  Outcome: Completed     Problem: Pain:  Goal: Pain level will decrease  2/9/2021 1618 by Za Loza RN  Outcome: Completed     Problem:  Activity:  Goal: Ability to perform activities at highest level will improve  2/9/2021 1618 by Za Loza RN  Outcome: Completed     Problem: Pain:  Goal: Pain level will decrease  2/9/2021 1618 by Za Loza RN  Outcome: Completed     Problem: Pain:  Goal: Control of acute pain  2/9/2021 1618 by Za Loza RN  Outcome: Completed     Problem: Pain:  Goal: Control of chronic pain  2/9/2021 1618 by Za Loza RN  Outcome: Completed     Problem: Pain:  Goal: Control of chronic pain  2/9/2021 1618 by Za Loza RN  Outcome: Completed

## 2021-02-10 NOTE — DISCHARGE SUMMARY
[address]                               DISCHARGE SUMMARY    PATIENT NAME: Nayana Real                      :        1929  MED REC NO:   [id number]                            ROOM:       3104  ACCOUNT NO:   [de-identified]                           ADMIT DATE: 2021  PROVIDER:     Breezy Dunbar MD                  DISCHARGE DATE:  2021    DISCHARGE DIAGNOSES:  1. Fracture of the left neck of femur, status post left femoral head  hemiarthroplasty. 2.  Acute blood-loss anemia secondary to above. 3.  Hypertension. 4.  Hypothyroidism. 5.  Senile dementia. 6.  _____. 7.  Hyperlipidemia. HOSPITAL COURSE:  The patient is a 79-year-old lady who lives in  [place] of the [facility]. Apparently, she fell off  the bed at the nursing home and complained of left hip pain. She had an  x-ray done, which revealed evidence of left femoral neck fracture. The  patient was sent to the ER and repeat x-rays confirmed the same. The  patient was admitted, and orthopedic consultation was obtained. The  patient had left femoral hemiarthroplasty done. She did well  postoperatively. She did not have any complaints of chest pain or  shortness of breath. She was eating and drinking fairly good. Pain was  controlled. She remained confused, which was her baseline. Her  hemoglobin was 9.4 on discharge. The patient was then accepted back at  [address] [place] [address] for rehab. She was  discharged in clinically stable condition on 2021. On the day of discharge, her blood pressure was 134/51, pulse 75 per  minute, temperature 98.8. Neck was supple. Chest:  Clear to  auscultation bilaterally. Heart:  Regular rate and rhythm. Abdomen:   Soft, nontender. Bowel sounds positive. Extremities:  No edema,  clubbing, or cyanosis.   Neurologically, she is awake and alert but  confused with no focal deficits, which is her baseline. The patient was sent back to [place] [facility] the [facility]. She was discharged in stable condition. DISCHARGE HOME MEDICATIONS:  Percocet 5/325 every six hours as needed,  aspirin 325 mg twice a day for 28 days, levothyroxine 88 mcg daily,  metoprolol succinate 25 mg daily, simvastatin 40 mg nightly.         Dede Lee MD    D: 02/09/2021 19:03:51       T: 02/10/2021 3:00:27     ANGELA/PRICILLA_CALEB_ARMEN  Job#: [id number]     Doc#: [id number]    CC:

## 2021-02-22 DIAGNOSIS — M25.511 ACUTE PAIN OF RIGHT SHOULDER: Primary | ICD-10-CM

## 2021-02-23 ENCOUNTER — OFFICE VISIT (OUTPATIENT)
Dept: ORTHOPEDIC SURGERY | Age: 86
End: 2021-02-23
Payer: MEDICARE

## 2021-02-23 VITALS — HEIGHT: 65 IN | BODY MASS INDEX: 18.66 KG/M2 | WEIGHT: 112 LBS

## 2021-02-23 DIAGNOSIS — M25.552 LEFT HIP PAIN: Primary | ICD-10-CM

## 2021-02-23 DIAGNOSIS — S42.021A DISPLACED FRACTURE OF SHAFT OF RIGHT CLAVICLE, INITIAL ENCOUNTER FOR CLOSED FRACTURE: ICD-10-CM

## 2021-02-23 DIAGNOSIS — S72.002A DISPLACED FRACTURE OF LEFT FEMORAL NECK (HCC): Primary | ICD-10-CM

## 2021-02-23 PROCEDURE — 1036F TOBACCO NON-USER: CPT | Performed by: ORTHOPAEDIC SURGERY

## 2021-02-23 PROCEDURE — G8484 FLU IMMUNIZE NO ADMIN: HCPCS | Performed by: ORTHOPAEDIC SURGERY

## 2021-02-23 PROCEDURE — 99213 OFFICE O/P EST LOW 20 MIN: CPT | Performed by: ORTHOPAEDIC SURGERY

## 2021-02-23 PROCEDURE — G8427 DOCREV CUR MEDS BY ELIG CLIN: HCPCS | Performed by: ORTHOPAEDIC SURGERY

## 2021-02-23 PROCEDURE — G8420 CALC BMI NORM PARAMETERS: HCPCS | Performed by: ORTHOPAEDIC SURGERY

## 2021-02-23 PROCEDURE — 1123F ACP DISCUSS/DSCN MKR DOCD: CPT | Performed by: ORTHOPAEDIC SURGERY

## 2021-02-23 PROCEDURE — 23500 CLTX CLAVICULAR FX W/O MNPJ: CPT | Performed by: ORTHOPAEDIC SURGERY

## 2021-02-23 PROCEDURE — 1111F DSCHRG MED/CURRENT MED MERGE: CPT | Performed by: ORTHOPAEDIC SURGERY

## 2021-02-23 PROCEDURE — 4040F PNEUMOC VAC/ADMIN/RCVD: CPT | Performed by: ORTHOPAEDIC SURGERY

## 2021-02-23 PROCEDURE — 1090F PRES/ABSN URINE INCON ASSESS: CPT | Performed by: ORTHOPAEDIC SURGERY

## 2021-02-23 NOTE — PROGRESS NOTES
Ms. Perla Loaiza returns today for follow-up of a left  Femoral fracture  fracture which was treated with hip hemiarthroplasty. Date of surgery was 2/7/2021. she reports intermittent pain. She also presents today for right clavicle fracture occurring from the same fall as her hip. [facility] Current Outpatient Medications   Medication Sig Dispense Refill    aspirin 325 MG EC tablet Take 1 tablet by mouth 2 times daily for 28 days 56 tablet 0    levothyroxine (SYNTHROID) 88 MCG tablet Take 1 tablet by mouth Daily 30 tablet 3    metoprolol succinate (TOPROL XL) 25 MG extended release tablet Take 25 mg by mouth daily      simvastatin (ZOCOR) 40 MG tablet Take 40 mg by mouth nightly       No current facility-administered medications for this visit. Past Surgical History:   Procedure Laterality Date    HIP SURGERY Left 2/7/2021    HIP HEMIARTHROPLASTY [facility] by Keyla [facility]res DO [facility]       Past Medical History:   Diagnosis Date    Cerebral aneurysm     Fracture of one rib, right side, subsequent encounter for fracture with routine healing     Hyperlipidemia     Hypertension     Left bundle branch block     Nondisplaced fracture of shaft of right clavicle     Thyroid disease          Physical Exam:   LLE- Skin intact with healing incision         Nontender to palpation at the area of the fracture site. ROM   full         The incision is healing well without evidence of infection. Pulses are intact and symmetric bilaterally         Strength limited         Sensation intact  2cm longer on left    RUE- Skin intact with tenderness to fracture site         Nontender to palpation at the area of the fracture site. ROM   limited         The incision is healing well without evidence of infection.          Pulses are intact and symmetric bilaterally         Strength limited         Sensation intact  Xrays:displaced clavicle shaft fracture, s/p hip hemiarthroplasty with good alignment    Radiographic findings reviewed with patient    Impression:   Encounter Diagnoses   Name Primary?     Displaced fracture of left femoral neck (HCC) Yes    Displaced fracture of shaft of right clavicle, initial encounter for closed fracture          Plan:   [address] to discontinue sling  Zackary hose on during the day and off at night  Continue aspirin BID  PT for rom and gait training  Fu in 6 weeks with xr right clavicle and left hip

## 2021-02-23 NOTE — PATIENT INSTRUCTIONS
wbat LLE  wbat RUE  Ok to discontinue sling  Zackary hose on during the day and off at night  Continue aspirin BID  PT for rom and gait training  Fu in 6 weeks with xr right clavicle and left hip

## 2021-04-05 DIAGNOSIS — S42.021A DISPLACED FRACTURE OF SHAFT OF RIGHT CLAVICLE, INITIAL ENCOUNTER FOR CLOSED FRACTURE: ICD-10-CM

## 2021-04-05 DIAGNOSIS — S72.002A DISPLACED FRACTURE OF LEFT FEMORAL NECK (HCC): Primary | ICD-10-CM

## 2021-04-07 ENCOUNTER — OFFICE VISIT (OUTPATIENT)
Dept: ORTHOPEDIC SURGERY | Age: 86
End: 2021-04-07

## 2021-04-07 VITALS — TEMPERATURE: 98 F | BODY MASS INDEX: 18.66 KG/M2 | HEIGHT: 65 IN | WEIGHT: 112 LBS

## 2021-04-07 DIAGNOSIS — S42.021A DISPLACED FRACTURE OF SHAFT OF RIGHT CLAVICLE, INITIAL ENCOUNTER FOR CLOSED FRACTURE: ICD-10-CM

## 2021-04-07 DIAGNOSIS — S72.002A DISPLACED FRACTURE OF LEFT FEMORAL NECK (HCC): Primary | ICD-10-CM

## 2021-04-07 PROCEDURE — 99024 POSTOP FOLLOW-UP VISIT: CPT | Performed by: NURSE PRACTITIONER

## 2021-04-07 RX ORDER — LORAZEPAM 0.5 MG/1
TABLET ORAL
COMMUNITY
Start: 2021-04-05

## 2021-04-07 RX ORDER — RIVASTIGMINE TARTRATE 3 MG/1
CAPSULE ORAL
COMMUNITY
Start: 2021-03-20

## 2021-04-07 RX ORDER — MIRTAZAPINE 30 MG/1
TABLET, FILM COATED ORAL
COMMUNITY
Start: 2021-03-29

## 2021-04-07 NOTE — PATIENT INSTRUCTIONS
PT to work with patient on ROM of right shoulder and left hip  wbat left lower extremity  No heavy lifting pushing or pulling with right upper extremity  Fu in 2 months with xr  I recommend someone com with patient to next appointment due to her confusion

## 2021-06-07 DIAGNOSIS — S72.002A DISPLACED FRACTURE OF LEFT FEMORAL NECK (HCC): ICD-10-CM

## 2021-06-07 DIAGNOSIS — S42.021A DISPLACED FRACTURE OF SHAFT OF RIGHT CLAVICLE, INITIAL ENCOUNTER FOR CLOSED FRACTURE: Primary | ICD-10-CM

## 2021-06-08 ENCOUNTER — OFFICE VISIT (OUTPATIENT)
Dept: ORTHOPEDIC SURGERY | Age: 86
End: 2021-06-08
Payer: MEDICARE

## 2021-06-08 VITALS — TEMPERATURE: 98 F | BODY MASS INDEX: 18.66 KG/M2 | HEIGHT: 65 IN | WEIGHT: 112 LBS

## 2021-06-08 DIAGNOSIS — S42.021A DISPLACED FRACTURE OF SHAFT OF RIGHT CLAVICLE, INITIAL ENCOUNTER FOR CLOSED FRACTURE: Primary | ICD-10-CM

## 2021-06-08 DIAGNOSIS — S72.002A DISPLACED FRACTURE OF LEFT FEMORAL NECK (HCC): ICD-10-CM

## 2021-06-08 PROCEDURE — 4040F PNEUMOC VAC/ADMIN/RCVD: CPT | Performed by: ORTHOPAEDIC SURGERY

## 2021-06-08 PROCEDURE — G8427 DOCREV CUR MEDS BY ELIG CLIN: HCPCS | Performed by: ORTHOPAEDIC SURGERY

## 2021-06-08 PROCEDURE — 1123F ACP DISCUSS/DSCN MKR DOCD: CPT | Performed by: ORTHOPAEDIC SURGERY

## 2021-06-08 PROCEDURE — 99212 OFFICE O/P EST SF 10 MIN: CPT | Performed by: ORTHOPAEDIC SURGERY

## 2021-06-08 PROCEDURE — 1090F PRES/ABSN URINE INCON ASSESS: CPT | Performed by: ORTHOPAEDIC SURGERY

## 2021-06-08 PROCEDURE — 1036F TOBACCO NON-USER: CPT | Performed by: ORTHOPAEDIC SURGERY

## 2021-06-08 PROCEDURE — G8420 CALC BMI NORM PARAMETERS: HCPCS | Performed by: ORTHOPAEDIC SURGERY

## 2021-06-08 NOTE — PROGRESS NOTES
Ms. Rhiannon Gutierrez returns today for follow-up of a left  Femoral fracture  fracture which was treated with hip hemiarthroplasty. Date of surgery was 2/7/2021. she reports intermittent pain. She also presents today for right clavicle fracture occurring from the same fall as her hip. Current Outpatient Medications   Medication Sig Dispense Refill    LORazepam (ATIVAN) 0.5 MG tablet       mirtazapine (REMERON) 30 MG tablet       rivastigmine (EXELON) 3 MG capsule       levothyroxine (SYNTHROID) 88 MCG tablet Take 1 tablet by mouth Daily 30 tablet 3    metoprolol succinate (TOPROL XL) 25 MG extended release tablet Take 25 mg by mouth daily      simvastatin (ZOCOR) 40 MG tablet Take 40 mg by mouth nightly      aspirin 325 MG EC tablet Take 1 tablet by mouth 2 times daily for 28 days 56 tablet 0     No current facility-administered medications for this visit. Past Surgical History:   Procedure Laterality Date    HIP SURGERY Left 2/7/2021    HIP HEMIARTHROPLASTY performed by Luis Guadarrama DO at [facility]       Past Medical History:   Diagnosis Date    Cerebral aneurysm     Fracture of one rib, right side, subsequent encounter for fracture with routine healing     Hyperlipidemia     Hypertension     Left bundle branch block     Nondisplaced fracture of shaft of right clavicle     Thyroid disease          Physical Exam:   LLE- Skin intact with healedincision         Nontender to palpation at the area of the fracture site. ROM   full         The incision is healing well without evidence of infection. Pulses are intact and symmetric bilaterally         Strength limited         Sensation intact  2cm longer on left    RUE- Skin intact with no tenderness to fracture site                 ROM   limited         The incision is healing well without evidence of infection.          Pulses are intact and symmetric bilaterally         Strength limited         Sensation intact  Xrays:displaced clavicle shaft fracture with healing noted, s/p hip hemiarthroplasty with good alignment    Radiographic findings reviewed with patient    Impression:   Encounter Diagnoses   Name Primary?     Displaced fracture of shaft of right clavicle, initial encounter for closed fracture Yes    Displaced fracture of left femoral neck ([address]          Plan:   PT to work with patient on ROM of right shoulder and left hip  wbat   Fu prn

## 2021-06-10 ENCOUNTER — TELEPHONE (OUTPATIENT)
Dept: ORTHOPEDIC SURGERY | Age: 86
End: 2021-06-10

## 2021-06-10 NOTE — TELEPHONE ENCOUNTER
Nurse Danny Tesfaye from [address] [facility] called in for advice. Patient in on 6/8/21 for  right clav and left hip xray. Nurse asking if patient still needs to follow LFT hip precautions. Please advise nurse at [phone] ext .

## 2022-12-07 ENCOUNTER — OUTSIDE SERVICES (OUTPATIENT)
Dept: INTERNAL MEDICINE CLINIC | Age: 87
End: 2022-12-07

## 2022-12-07 DIAGNOSIS — R53.1 WEAKNESS: ICD-10-CM

## 2022-12-07 DIAGNOSIS — Z51.5 PALLIATIVE CARE BY SPECIALIST: ICD-10-CM

## 2022-12-07 DIAGNOSIS — S72.002A DISPLACED FRACTURE OF LEFT FEMORAL NECK (HCC): ICD-10-CM

## 2022-12-07 DIAGNOSIS — E44.1 MILD PROTEIN-CALORIE MALNUTRITION (HCC): Primary | ICD-10-CM

## 2023-01-25 ENCOUNTER — OUTSIDE SERVICES (OUTPATIENT)
Dept: INTERNAL MEDICINE CLINIC | Age: 88
End: 2023-01-25

## 2023-01-25 DIAGNOSIS — R26.89 OTHER ABNORMALITIES OF GAIT AND MOBILITY: ICD-10-CM

## 2023-01-25 DIAGNOSIS — R26.2 DIFFICULTY IN WALKING, NOT ELSEWHERE CLASSIFIED: ICD-10-CM

## 2023-01-25 DIAGNOSIS — E03.9 HYPOTHYROIDISM, UNSPECIFIED TYPE: ICD-10-CM

## 2023-01-25 DIAGNOSIS — R48.8 OTHER SYMBOLIC DYSFUNCTIONS: ICD-10-CM

## 2023-01-25 DIAGNOSIS — R53.1 WEAKNESS: ICD-10-CM

## 2023-01-25 DIAGNOSIS — F41.9 ANXIETY DISORDER, UNSPECIFIED TYPE: ICD-10-CM

## 2023-01-25 DIAGNOSIS — I11.9 HYPERTENSIVE HEART DISEASE WITHOUT HEART FAILURE: ICD-10-CM

## 2023-01-25 DIAGNOSIS — I44.7 LEFT BUNDLE-BRANCH BLOCK, UNSPECIFIED: ICD-10-CM

## 2023-01-25 DIAGNOSIS — Z74.1 NEED FOR ASSISTANCE WITH PERSONAL CARE: ICD-10-CM

## 2023-01-25 DIAGNOSIS — G93.41 METABOLIC ENCEPHALOPATHY: ICD-10-CM

## 2023-01-25 DIAGNOSIS — I67.1 CEREBRAL ANEURYSM, NONRUPTURED: ICD-10-CM

## 2023-01-25 DIAGNOSIS — F03.90 DEMENTIA WITHOUT BEHAVIORAL DISTURBANCE, PSYCHOTIC DISTURBANCE, MOOD DISTURBANCE, OR ANXIETY, UNSPECIFIED DEMENTIA SEVERITY, UNSPECIFIED DEMENTIA TYPE (HCC): Primary | ICD-10-CM

## 2023-01-25 DIAGNOSIS — F33.9 RECURRENT MAJOR DEPRESSIVE DISORDER, REMISSION STATUS UNSPECIFIED (HCC): ICD-10-CM

## 2023-01-25 DIAGNOSIS — E78.5 HYPERLIPIDEMIA, UNSPECIFIED HYPERLIPIDEMIA TYPE: ICD-10-CM

## 2023-02-05 VITALS
SYSTOLIC BLOOD PRESSURE: 142 MMHG | BODY MASS INDEX: 21.7 KG/M2 | HEART RATE: 75 BPM | DIASTOLIC BLOOD PRESSURE: 82 MMHG | OXYGEN SATURATION: 95 % | RESPIRATION RATE: 18 BRPM | TEMPERATURE: 97.8 F | WEIGHT: 130.4 LBS

## 2023-02-05 RX ORDER — BISACODYL 10 MG
10 SUPPOSITORY, RECTAL RECTAL DAILY PRN
COMMUNITY

## 2023-02-05 RX ORDER — HYDROXYZINE PAMOATE 25 MG/1
25 CAPSULE ORAL 4 TIMES DAILY PRN
COMMUNITY

## 2023-02-05 RX ORDER — ACETAMINOPHEN 325 MG/1
650 TABLET ORAL EVERY 4 HOURS PRN
COMMUNITY

## 2023-02-05 RX ORDER — GUAIFENESIN/DEXTROMETHORPHAN 100-10MG/5
10 SYRUP ORAL EVERY 8 HOURS PRN
COMMUNITY

## 2023-02-05 ASSESSMENT — ENCOUNTER SYMPTOMS
SHORTNESS OF BREATH: 0
NAUSEA: 0
ABDOMINAL PAIN: 0
VOMITING: 0
DIARRHEA: 0

## 2023-02-06 NOTE — PROGRESS NOTES
Visit Date: 01/25/2023  Tai Medrano  3/31/1929  female 80 y.o. Subjective:    CC: Patient presents with no current complaints. HPI: Patient seen and examined. Medications on chart reviewed. Labs are reviewed. Discussed with nursing staff. ROS:  Review of Systems   Constitutional:  Negative for chills and fever. Respiratory:  Negative for shortness of breath. Cardiovascular:  Negative for chest pain. Gastrointestinal:  Negative for abdominal pain, diarrhea, nausea and vomiting. Genitourinary:  Negative for dysuria and frequency. Neurological:  Negative for dizziness and headaches. Psychiatric/Behavioral:  Positive for confusion. Current Meds: Refer to nursing home record    PMH:    Medical Problems: reviewed and updated       Diagnosis Date    Cerebral aneurysm     Fracture of one rib, right side, subsequent encounter for fracture with routine healing     Hyperlipidemia     Hypertension     Left bundle branch block     Nondisplaced fracture of shaft of right clavicle     Thyroid disease         Surgical Hx: reviewed and updated   Past Surgical History:   Procedure Laterality Date    HIP SURGERY Left 2/7/2021    HIP HEMIARTHROPLASTY performed by Kelly Watson DO at [address]          FH: reviewed and updated   No family history on file. SH: reviewed and updated    reports that she has never smoked. She has never used smokeless tobacco. She reports that she does not drink alcohol. Objective:  BP (!) 142/82   Pulse 75   Temp 97.8 °F (36.6 °C)   Resp 18   Wt 130 lb 6.4 oz (59.1 kg)   SpO2 95%   BMI 21.70 kg/m²      Physical Exam  HENT:      Head: Normocephalic and atraumatic. Right Ear: Tympanic membrane normal.      Left Ear: Tympanic membrane normal.      Nose: Nose normal.   Eyes:      Extraocular Movements: Extraocular movements intact. Pupils: Pupils are equal, round, and reactive to light.    Cardiovascular:      Rate and Rhythm: Normal rate and regular rhythm. Heart sounds: S1 normal and S2 normal. No murmur heard. No friction rub. No gallop. Pulmonary:      Breath sounds: Normal breath sounds. Comments: Clear to ausculation bilaterally  Non-traumatic  Chest:      Chest wall: No tenderness. Abdominal:      General: Bowel sounds are normal. There is no distension. Palpations: Abdomen is soft. There is no mass. Tenderness: There is no abdominal tenderness. Comments: No organomegaly   Musculoskeletal:         General: No tenderness. Normal range of motion. Cervical back: Neck supple. No tenderness. Right lower leg: Edema (Trace) present. Left lower leg: Edema (Trace) present. Comments: No clubbing, No cyanosis  Osteoarthritic changes of bilateral hands   Skin:     General: Skin is warm and dry. Comments: Good turgor   Neurological:      General: No focal deficit present.       Comments: Pleasantly confused     LAB RESULTS:  Date: 01/26/2023   CBC HEMOGRAM       WBC  5.71 X 10/3 UL 4.8-10.8  Final           RBC  4.10 X 10/6 UL 4.2-5.4 L Final           HEMOGLOBIN  11.6 G/DL 12.0-16.0 L Final           HEMATOCRIT  34.5 % 37.0-47.0 L Final           MCV  84.3 fL 81.0-99.0  Final           MCH  28.2 PG 27.0-31.0  Final           MCHC  33.5 % 33.0-37.0  Final           RDW  13.7 % 12.0-15.0  Final           HDW  2.52 G/DL 2.00-3.20  Final           PLATELET  386 /.0-400.0  Final           MPV  9.5 fL 7.2-11.1  Final       COMPREHENSIVE METABOLIC       SODIUM, SR  142 MEQ/L 135-148  Final           POTASSIUM, SR  4.7 MEQ/L 3.5-5.3  Final           CHLORIDE, SR  108 MEQ/L   Final           CARBON DIOXIDE  31 MEQ/L 21-32  Final           GLUCOSE  74 MG/DL 61.0-114.0  Final           UREA NITROGEN  21 MG/DL 8-23  Final           CREATININE, SERUM  1.0 MG/DL 0.5-1.5  Final           BUN/CREATININE RATIO  21  8.0-26.0  Final           CALCIUM, SERUM  9.2 MG/DL 8.5-10.8  Final PROTEIN-TOTAL, S  6.0 G/DL 6.2-8.5 L Final           ALBUMIN, SERUM  3.5 G/DL 3.2-5.3  Final           AG RATIO  1.4  1.0-2.1  Final           GLOBULIN  2.5  1.5-4.8  Final           ALKP PHOS, S  82 U/L   Final           ALT/SGPT  10 U/L 4-44  Final           AST/SGOT  19 U/L 9-42  Final           BILIRUBIN, TOTAL  0.3 MG/DL 0.0-1.5  Final           GFR ESTIMATION 1  48.7  6.0-7999.0  Final      If -American, result is: 56.1   TSH       TSH  1.31 MIU/ML . 55-4.78  Final        Assessment & Plan:  1. Dementia without behavioral disturbance, psychotic disturbance, mood disturbance, or anxiety, unspecified dementia severity, unspecified dementia type ([facility]  2. Metabolic encephalopathy  3. Hypertensive heart disease without heart failure  4. Left bundle-branch block, unspecified  5. Hyperlipidemia, unspecified hyperlipidemia type  6. Recurrent major depressive disorder, remission status unspecified ([facility]  7. Anxiety disorder, unspecified type  8. Hypothyroidism, unspecified type  9. Cerebral aneurysm, nonruptured  10. Difficulty in walking, not elsewhere classified  11. Weakness  12. Need for assistance with personal care  13. Other abnormalities of gait and mobility  14. Other symbolic dysfunctions     Continue current management.     Rae Moulton am scribing for Dr. Chao Olvera MD, personally performed the services described in this documentation as scribed by Tray Wolf and it is both accurate and complete

## 2023-03-08 ENCOUNTER — OUTSIDE SERVICES (OUTPATIENT)
Dept: INTERNAL MEDICINE CLINIC | Age: 88
End: 2023-03-08
Payer: MEDICARE

## 2023-03-08 DIAGNOSIS — I44.7 LEFT BUNDLE-BRANCH BLOCK, UNSPECIFIED: ICD-10-CM

## 2023-03-08 DIAGNOSIS — I11.9 HYPERTENSIVE HEART DISEASE WITHOUT HEART FAILURE: ICD-10-CM

## 2023-03-08 DIAGNOSIS — R26.89 OTHER ABNORMALITIES OF GAIT AND MOBILITY: ICD-10-CM

## 2023-03-08 DIAGNOSIS — F33.9 RECURRENT MAJOR DEPRESSIVE DISORDER, REMISSION STATUS UNSPECIFIED (HCC): ICD-10-CM

## 2023-03-08 DIAGNOSIS — F41.9 ANXIETY DISORDER, UNSPECIFIED TYPE: ICD-10-CM

## 2023-03-08 DIAGNOSIS — I67.1 CEREBRAL ANEURYSM, NONRUPTURED: ICD-10-CM

## 2023-03-08 DIAGNOSIS — R48.8 OTHER SYMBOLIC DYSFUNCTIONS: ICD-10-CM

## 2023-03-08 DIAGNOSIS — G93.41 METABOLIC ENCEPHALOPATHY: ICD-10-CM

## 2023-03-08 DIAGNOSIS — E78.5 HYPERLIPIDEMIA, UNSPECIFIED HYPERLIPIDEMIA TYPE: ICD-10-CM

## 2023-03-08 DIAGNOSIS — Z74.1 NEED FOR ASSISTANCE WITH PERSONAL CARE: ICD-10-CM

## 2023-03-08 DIAGNOSIS — E03.9 HYPOTHYROIDISM, UNSPECIFIED TYPE: ICD-10-CM

## 2023-03-08 DIAGNOSIS — F03.90 DEMENTIA WITHOUT BEHAVIORAL DISTURBANCE, PSYCHOTIC DISTURBANCE, MOOD DISTURBANCE, OR ANXIETY, UNSPECIFIED DEMENTIA SEVERITY, UNSPECIFIED DEMENTIA TYPE (HCC): Primary | ICD-10-CM

## 2023-03-08 DIAGNOSIS — R53.1 WEAKNESS: ICD-10-CM

## 2023-03-08 DIAGNOSIS — R26.2 DIFFICULTY IN WALKING, NOT ELSEWHERE CLASSIFIED: ICD-10-CM

## 2023-03-08 PROCEDURE — 99308 SBSQ NF CARE LOW MDM 20: CPT | Performed by: INTERNAL MEDICINE

## 2023-03-13 VITALS
WEIGHT: 130.6 LBS | HEART RATE: 72 BPM | RESPIRATION RATE: 16 BRPM | SYSTOLIC BLOOD PRESSURE: 132 MMHG | BODY MASS INDEX: 21.73 KG/M2 | TEMPERATURE: 98 F | OXYGEN SATURATION: 95 % | DIASTOLIC BLOOD PRESSURE: 84 MMHG

## 2023-03-13 ASSESSMENT — ENCOUNTER SYMPTOMS
ABDOMINAL PAIN: 0
DIARRHEA: 0
SHORTNESS OF BREATH: 0
NAUSEA: 0
VOMITING: 0

## 2023-03-13 NOTE — PROGRESS NOTES
Visit Date: 03/08/2023 - [facility]  3/31/1929  female 80 y.o. Subjective:    CC: Patient presents with no current complaints. This is a follow-up nursing home visit. HPI: Patient seen and examined. Medications on chart reviewed. Labs are reviewed. Discussed with nursing staff. ROS:  Review of Systems   Constitutional:  Negative for chills and fever. Respiratory:  Negative for shortness of breath. Cardiovascular:  Negative for chest pain. Gastrointestinal:  Negative for abdominal pain, diarrhea, nausea and vomiting. Genitourinary:  Negative for dysuria and frequency. Neurological:  Negative for dizziness and headaches. Current Meds: Refer to nursing home record    PMH:    Medical Problems: reviewed and updated       Diagnosis Date    Cerebral aneurysm     Fracture of one rib, right side, subsequent encounter for fracture with routine healing     Hyperlipidemia     Hypertension     Left bundle branch block     Nondisplaced fracture of shaft of right clavicle     Thyroid disease         Surgical Hx: reviewed and updated   Past Surgical History:   Procedure Laterality Date    HIP SURGERY Left 2/7/2021    HIP HEMIARTHROPLASTY performed by Jennifer Arauz DO at [address] [place] [address]          FH: reviewed and updated   No family history on file. SH: reviewed and updated    reports that she has never smoked. She has never used smokeless tobacco. She reports that she does not drink alcohol. Objective:  /84   Pulse 72   Temp 98 °F (36.7 °C)   Resp 16   Wt 130 lb 9.6 oz (59.2 kg)   SpO2 95%   BMI 21.73 kg/m²      Physical Exam  HENT:      Head: Normocephalic and atraumatic. Right Ear: Tympanic membrane normal.      Left Ear: Tympanic membrane normal.      Nose: Nose normal.   Eyes:      Extraocular Movements: Extraocular movements intact. Pupils: Pupils are equal, round, and reactive to light.    Cardiovascular:      Rate and Rhythm: Normal rate and regular rhythm. Heart sounds: S1 normal and S2 normal. No murmur heard. No friction rub. No gallop. Pulmonary:      Breath sounds: Normal breath sounds. Comments: Clear to ausculation bilaterally  Non-traumatic  Chest:      Chest wall: No tenderness. Abdominal:      General: Bowel sounds are normal. There is no distension. Palpations: Abdomen is soft. There is no mass. Tenderness: There is no abdominal tenderness. Comments: No organomegaly   Musculoskeletal:         General: No tenderness. Normal range of motion. Cervical back: Neck supple. No tenderness. Right lower leg: Edema (Trace) present. Left lower leg: Edema (Trace) present. Comments: No clubbing, No cyanosis  Osteoarthritic changes of bilateral hands   Skin:     General: Skin is warm and dry. Neurological:      General: No focal deficit present. Mental Status: She is alert. She is confused. Comments: Pleasantly confused       Assessment & Plan:  1. Dementia without behavioral disturbance, psychotic disturbance, mood disturbance, or anxiety, unspecified dementia severity, unspecified dementia type ([address]  2. Metabolic encephalopathy  3. Hypertensive heart disease without heart failure  4. Left bundle-branch block, unspecified  5. Hyperlipidemia, unspecified hyperlipidemia type  6. Recurrent major depressive disorder, remission status unspecified ([address]  7. Anxiety disorder, unspecified type  8. Hypothyroidism, unspecified type  9. Cerebral aneurysm, nonruptured  10. Difficulty in walking, not elsewhere classified  11. Weakness  12. Need for assistance with personal care  13. Other abnormalities of gait and mobility  14. Other symbolic dysfunctions     Continue current management.      Caryn Diehl am scribing for Dr. Daniel Angel MD, personally performed the services described in this documentation as scribed by Lyndsey Carter and it is both accurate and complete

## 2023-05-24 ENCOUNTER — OUTSIDE SERVICES (OUTPATIENT)
Dept: INTERNAL MEDICINE CLINIC | Age: 88
End: 2023-05-24
Payer: MEDICARE

## 2023-05-24 DIAGNOSIS — I67.1 CEREBRAL ANEURYSM, NONRUPTURED: ICD-10-CM

## 2023-05-24 DIAGNOSIS — G93.41 METABOLIC ENCEPHALOPATHY: ICD-10-CM

## 2023-05-24 DIAGNOSIS — R26.89 OTHER ABNORMALITIES OF GAIT AND MOBILITY: ICD-10-CM

## 2023-05-24 DIAGNOSIS — R48.8 OTHER SYMBOLIC DYSFUNCTIONS: ICD-10-CM

## 2023-05-24 DIAGNOSIS — E78.5 HYPERLIPIDEMIA, UNSPECIFIED HYPERLIPIDEMIA TYPE: ICD-10-CM

## 2023-05-24 DIAGNOSIS — F03.90 DEMENTIA WITHOUT BEHAVIORAL DISTURBANCE, PSYCHOTIC DISTURBANCE, MOOD DISTURBANCE, OR ANXIETY, UNSPECIFIED DEMENTIA SEVERITY, UNSPECIFIED DEMENTIA TYPE (HCC): Primary | ICD-10-CM

## 2023-05-24 DIAGNOSIS — R26.2 DIFFICULTY IN WALKING, NOT ELSEWHERE CLASSIFIED: ICD-10-CM

## 2023-05-24 DIAGNOSIS — E03.9 HYPOTHYROIDISM, UNSPECIFIED TYPE: ICD-10-CM

## 2023-05-24 DIAGNOSIS — F33.9 RECURRENT MAJOR DEPRESSIVE DISORDER, REMISSION STATUS UNSPECIFIED (HCC): ICD-10-CM

## 2023-05-24 DIAGNOSIS — R53.1 WEAKNESS: ICD-10-CM

## 2023-05-24 DIAGNOSIS — I44.7 LEFT BUNDLE-BRANCH BLOCK, UNSPECIFIED: ICD-10-CM

## 2023-05-24 DIAGNOSIS — I11.9 HYPERTENSIVE HEART DISEASE WITHOUT HEART FAILURE: ICD-10-CM

## 2023-05-24 DIAGNOSIS — F41.9 ANXIETY DISORDER, UNSPECIFIED TYPE: ICD-10-CM

## 2023-05-24 DIAGNOSIS — Z74.1 NEED FOR ASSISTANCE WITH PERSONAL CARE: ICD-10-CM

## 2023-05-24 PROCEDURE — 99308 SBSQ NF CARE LOW MDM 20: CPT | Performed by: INTERNAL MEDICINE

## 2023-06-02 ENCOUNTER — TELEPHONE (OUTPATIENT)
Dept: INTERNAL MEDICINE CLINIC | Age: 88
End: 2023-06-02

## 2023-06-02 NOTE — TELEPHONE ENCOUNTER
Lupe from [facility] called asking if you are going to continue to follow patient's care in facility. Lupe can be reached at [phone] ext.  [address] [facility]

## 2023-06-04 RX ORDER — LEVOTHYROXINE SODIUM 0.05 MG/1
50 TABLET ORAL DAILY
COMMUNITY

## 2023-06-04 RX ORDER — GUAIFENESIN 200 MG/10ML
200 LIQUID ORAL 3 TIMES DAILY PRN
COMMUNITY

## 2023-06-04 ASSESSMENT — ENCOUNTER SYMPTOMS
ABDOMINAL PAIN: 0
SHORTNESS OF BREATH: 0
NAUSEA: 0
DIARRHEA: 0
VOMITING: 0

## 2023-06-05 NOTE — PROGRESS NOTES
Visit Date: 05/24/2023 - [facility]  3/31/1929  female 80 y.o. Subjective:    CC: Patient presents with no current complaints. This is a follow-up nursing home visit. HPI: Patient seen and examined. Medications on chart reviewed. Labs are reviewed. Discussed with nursing staff. ROS:  Review of Systems   Constitutional:  Negative for chills and fever. Respiratory:  Negative for shortness of breath. Cardiovascular:  Negative for chest pain. Gastrointestinal:  Negative for abdominal pain, diarrhea, nausea and vomiting. Genitourinary:  Negative for dysuria and frequency. Neurological:  Negative for dizziness and headaches. Current Meds: Refer to nursing home record    PMH:    Medical Problems: reviewed and updated       Diagnosis Date    Cerebral aneurysm     Fracture of one rib, right side, subsequent encounter for fracture with routine healing     Hyperlipidemia     Hypertension     Left bundle branch block     Nondisplaced fracture of shaft of right clavicle     Thyroid disease         Surgical Hx: reviewed and updated   Past Surgical History:   Procedure Laterality Date    HIP SURGERY Left 2/7/2021    HIP HEMIARTHROPLASTY performed by Noris Holbrook DO at [address]          FH: reviewed and updated   No family history on file. SH: reviewed and updated    reports that she has never smoked. She has never used smokeless tobacco. She reports that she does not drink alcohol. Objective: There were no vitals taken for this visit. Physical Exam  Constitutional:       General: She is awake. HENT:      Head: Normocephalic and atraumatic. Right Ear: Tympanic membrane normal.      Left Ear: Tympanic membrane normal.      Nose: Nose normal.   Eyes:      Extraocular Movements: Extraocular movements intact. Pupils: Pupils are equal, round, and reactive to light.    Cardiovascular:      Rate and Rhythm: Normal rate and regular

## 2024-09-11 ENCOUNTER — OUTSIDE SERVICES (OUTPATIENT)
Dept: PRIMARY CARE CLINIC | Age: 89
End: 2024-09-11
Payer: MEDICARE

## 2024-09-11 DIAGNOSIS — I10 PRIMARY HYPERTENSION: ICD-10-CM

## 2024-09-11 DIAGNOSIS — E03.9 HYPOTHYROIDISM, UNSPECIFIED TYPE: ICD-10-CM

## 2024-09-11 DIAGNOSIS — F03.B0 MODERATE DEMENTIA WITHOUT BEHAVIORAL DISTURBANCE, PSYCHOTIC DISTURBANCE, MOOD DISTURBANCE, OR ANXIETY, UNSPECIFIED DEMENTIA TYPE (HCC): Primary | ICD-10-CM

## 2024-09-11 DIAGNOSIS — F41.9 ANXIETY: ICD-10-CM

## 2024-09-11 DIAGNOSIS — R53.1 WEAKNESS: ICD-10-CM

## 2024-09-11 PROCEDURE — 99306 1ST NF CARE HIGH MDM 50: CPT | Performed by: INTERNAL MEDICINE

## 2024-09-11 PROCEDURE — 99497 ADVNCD CARE PLAN 30 MIN: CPT | Performed by: INTERNAL MEDICINE

## 2024-09-24 ASSESSMENT — VISUAL ACUITY: OU: 1

## 2024-09-25 ENCOUNTER — OUTSIDE SERVICES (OUTPATIENT)
Dept: PRIMARY CARE CLINIC | Age: 89
End: 2024-09-25
Payer: MEDICARE

## 2024-09-25 DIAGNOSIS — F41.9 ANXIETY: ICD-10-CM

## 2024-09-25 DIAGNOSIS — N17.9 ACUTE RENAL FAILURE, UNSPECIFIED ACUTE RENAL FAILURE TYPE (HCC): ICD-10-CM

## 2024-09-25 DIAGNOSIS — F03.B0 MODERATE DEMENTIA WITHOUT BEHAVIORAL DISTURBANCE, PSYCHOTIC DISTURBANCE, MOOD DISTURBANCE, OR ANXIETY, UNSPECIFIED DEMENTIA TYPE (HCC): Primary | ICD-10-CM

## 2024-09-25 DIAGNOSIS — E03.9 HYPOTHYROIDISM, UNSPECIFIED TYPE: ICD-10-CM

## 2024-09-25 DIAGNOSIS — I10 PRIMARY HYPERTENSION: ICD-10-CM

## 2024-09-25 DIAGNOSIS — R53.1 WEAKNESS: ICD-10-CM

## 2024-09-25 PROCEDURE — 99309 SBSQ NF CARE MODERATE MDM 30: CPT | Performed by: INTERNAL MEDICINE

## 2024-09-30 ASSESSMENT — VISUAL ACUITY: OU: 1

## 2024-09-30 NOTE — PROGRESS NOTES
Palpations: Abdomen is soft. There is no mass.      Tenderness: There is no abdominal tenderness. There is no guarding or rebound.      Hernia: No hernia is present.      Comments: No rigidity   Musculoskeletal:         General: Normal range of motion.      Right shoulder: Normal.      Left shoulder: Normal.      Right upper arm: Normal.      Left upper arm: Normal.      Cervical back: Normal range of motion.      Right hip: Normal.      Left hip: Normal.      Right upper leg: Normal.      Left upper leg: Normal.      Right lower leg: Normal.      Left lower leg: Normal.      Right foot: Normal.      Left foot: Normal.   Lymphadenopathy:      Comments: No palpable or visible regional lymphadenopathy   Skin:     General: Skin is warm and dry.      Findings: No bruising, ecchymosis, lesion or rash.   Neurological:      General: No focal deficit present.      Mental Status: Mental status is at baseline.      Cranial Nerves: No cranial nerve deficit.      Deep Tendon Reflexes: Reflexes normal.   Psychiatric:         Mood and Affect: Mood and affect normal. Mood is not depressed.         Behavior: Behavior normal. Behavior is not agitated.         Cognition and Memory: Cognition is impaired. Memory is impaired.      Comments: No apparent anxiety         Assessment & Plan:  1. Moderate dementia without behavioral disturbance, psychotic disturbance, mood disturbance, or anxiety, unspecified dementia type (HCC)  2. Primary hypertension  3. Hypothyroidism, unspecified type  4. Weakness  5. Anxiety  6. Acute renal failure, unspecified acute renal failure type (HCC)      Chart reviewed   medications reviewed   Renal failure ,case discussed with family and pt was switched to hospice  Htn blood pressure under good control on metoprolol   Dementia with behaviors add depakote 250 mg qhs   Anxiety on buspar and klonopin 0.5 mg bid   Hypothyroidism on levothyroxine  Monitor labs   Continue current treatment        Ellie CHANDLER

## 2025-04-06 NOTE — PROGRESS NOTES
Ms. Sheila Vee returns today for follow-up of a left  Femoral fracture  fracture which was treated with hip hemiarthroplasty. Date of surgery was 2/7/2021. she reports intermittent pain. She also presents today for right clavicle fracture occurring from the same fall as her hip. She presents today alone nad slightly confused on where she is and why. Verbal redirection given to patient. She reports no pain    Current Outpatient Medications   Medication Sig Dispense Refill    LORazepam (ATIVAN) 0.5 MG tablet       mirtazapine (REMERON) 30 MG tablet       rivastigmine (EXELON) 3 MG capsule       levothyroxine (SYNTHROID) 88 MCG tablet Take 1 tablet by mouth Daily 30 tablet 3    metoprolol succinate (TOPROL XL) 25 MG extended release tablet Take 25 mg by mouth daily      simvastatin (ZOCOR) 40 MG tablet Take 40 mg by mouth nightly      aspirin 325 MG EC tablet Take 1 tablet by mouth 2 times daily for 28 days 56 tablet 0     No current facility-administered medications for this visit. Past Surgical History:   Procedure Laterality Date    HIP SURGERY Left 2/7/2021    HIP HEMIARTHROPLASTY performed by Tom Mancia DO at [address] [facility]       Past Medical History:   Diagnosis Date    Cerebral aneurysm     Fracture of one rib, right side, subsequent encounter for fracture with routine healing     Hyperlipidemia     Hypertension     Left bundle branch block     Nondisplaced fracture of shaft of right clavicle     Thyroid disease          Physical Exam:   LLE- Skin intact with healing incision         Nontender to palpation at the area of the fracture site. ROM   full         The incision is healing well without evidence of infection.          Pulses are intact and symmetric bilaterally         Strength limited         Sensation intact  2cm longer on left    RUE- Skin intact with minimal tenderness to fracture site                 ROM   limited         The incision is healing well without evidence of infection. Pulses are intact and symmetric bilaterally         Strength limited         Sensation intact  Xrays:displaced clavicle shaft fracture with healing noted, s/p hip hemiarthroplasty with good alignment    Radiographic findings reviewed with patient    Impression:   Encounter Diagnoses   Name Primary?     Displaced fracture of left femoral neck (HCC) Yes    Displaced fracture of shaft of right clavicle, initial encounter for closed fracture          Plan:   PT to work with patient on ROM of right shoulder and left hip  wbat left lower extremity  No heavy lifting pushing or pulling with right upper extremity  Fu in 2 months with xr- preferably with family member or staff from facility due to patient confusion Statement Selected

## (undated) DEVICE — MEDI-VAC YANKAUER SUCTION HANDLE: Brand: CARDINAL HEALTH

## (undated) DEVICE — TUBING, SUCTION, 1/4" X 10', STRAIGHT: Brand: MEDLINE

## (undated) DEVICE — SUTURE SUTTAPE L40IN DIA1.3MM NONABSORBABLE WHT BLU L26.5MM AR7500

## (undated) DEVICE — HANDPIECE SET WITH BONE CLEANING TIP: Brand: INTERPULSE

## (undated) DEVICE — SYRINGE IRRIG 60ML SFT PLIABLE BLB EZ TO GRP 1 HND USE W/

## (undated) DEVICE — GUN GLUE STRYKER

## (undated) DEVICE — INTENDED FOR TISSUE SEPARATION, AND OTHER PROCEDURES THAT REQUIRE A SHARP SURGICAL BLADE TO PUNCTURE OR CUT.: Brand: BARD-PARKER ® STAINLESS STEEL BLADES

## (undated) DEVICE — Device

## (undated) DEVICE — NDL CNTR 40CT FM MAG: Brand: MEDLINE INDUSTRIES, INC.

## (undated) DEVICE — PITCHER PT 1200ML W HNDL CSR WRP

## (undated) DEVICE — SHEET DRAPE FULL 70X100

## (undated) DEVICE — PENCIL ES L3M BTTN SWCH HOLSTER W/ BLDE ELECTRD EDGE

## (undated) DEVICE — GOWN,SIRUS,POLYRNF,BRTHSLV,XLN/XL,20/CS: Brand: MEDLINE

## (undated) DEVICE — BLADE ES L6IN ELASTOMERIC COAT EXT DURABLE BEND UPTO 90DEG

## (undated) DEVICE — SUTURE STRATAFIX SYMMETRIC SZ 1 L18IN ABSRB VLT CT1 L36CM SXPP1A404

## (undated) DEVICE — 3M™ STERI-DRAPE™ U-DRAPE 1015: Brand: STERI-DRAPE™

## (undated) DEVICE — Z DISCONTINUED PER MEDLINE USE 2741944 DRESSING AQUACEL 12 IN SURG W9XL30CM SIL CVR WTRPRF VIR BACT BARR ANTIMIC

## (undated) DEVICE — APPLICATOR MEDICATED 26 CC SOLUTION HI LT ORNG CHLORAPREP

## (undated) DEVICE — SET MAJOR INSTR ORTHO

## (undated) DEVICE — TOWEL,OR,DSP,ST,BLUE,STD,6/PK,12PK/CS: Brand: MEDLINE

## (undated) DEVICE — BANDAGE COMPR W6INXL5YD SELF ADH COHESIVE CO FLX

## (undated) DEVICE — COVER,TABLE,60X90,STERILE: Brand: MEDLINE

## (undated) DEVICE — SPONGE LAP W18XL18IN WHT COT 4 PLY FLD STRUNG RADPQ DISP ST

## (undated) DEVICE — PAD,ABDOMINAL,8"X10",ST,LF: Brand: MEDLINE

## (undated) DEVICE — DOUBLE BASIN SET: Brand: MEDLINE INDUSTRIES, INC.

## (undated) DEVICE — GAUZE,SPONGE,4"X4",16PLY,STRL,LF,10/TRAY: Brand: MEDLINE

## (undated) DEVICE — Z DISCONTINUED USE 2275686 GLOVE SURG SZ 8 L12IN FNGR THK13MIL WHT ISOLEX POLYISOPRENE

## (undated) DEVICE — COVER,LIGHT HANDLE,FLX,1/PK: Brand: MEDLINE INDUSTRIES, INC.

## (undated) DEVICE — 3M™ IOBAN™ 2 ANTIMICROBIAL INCISE DRAPE 6651EZ: Brand: IOBAN™ 2

## (undated) DEVICE — ELECTRODE PT RET AD L9FT HI MOIST COND ADH HYDRGEL CORDED

## (undated) DEVICE — SOLUTION IV IRRIG POUR BRL 0.9% SODIUM CHL 2F7124

## (undated) DEVICE — PACK,ORTHOPEDIC III,AURORA: Brand: MEDLINE

## (undated) DEVICE — DRESSING GZ W1XL8IN COT XRFRM N ADH OVERWRAP CURAD

## (undated) DEVICE — MARKER,SKIN,WI/RULER AND LABELS: Brand: MEDLINE

## (undated) DEVICE — 2108 SERIES SAGITTAL BLADE (24.8 X 0.88 X 90.5MM)

## (undated) DEVICE — GOWN,SIRUS,FABRNF,XL,20/CS: Brand: MEDLINE